# Patient Record
Sex: FEMALE | Race: WHITE | NOT HISPANIC OR LATINO | Employment: FULL TIME | URBAN - METROPOLITAN AREA
[De-identification: names, ages, dates, MRNs, and addresses within clinical notes are randomized per-mention and may not be internally consistent; named-entity substitution may affect disease eponyms.]

---

## 2019-06-21 ENCOUNTER — TELEPHONE (OUTPATIENT)
Dept: SURGERY | Facility: CLINIC | Age: 39
End: 2019-06-21

## 2019-06-21 ENCOUNTER — OFFICE VISIT (OUTPATIENT)
Dept: SURGERY | Facility: CLINIC | Age: 39
End: 2019-06-21
Payer: COMMERCIAL

## 2019-06-21 VITALS — WEIGHT: 174.7 LBS | BODY MASS INDEX: 29.82 KG/M2 | HEIGHT: 64 IN

## 2019-06-21 DIAGNOSIS — N64.81 BREAST PTOSIS: Primary | ICD-10-CM

## 2019-06-21 PROCEDURE — 99203 OFFICE O/P NEW LOW 30 MIN: CPT | Performed by: PLASTIC SURGERY

## 2019-06-21 ASSESSMENT — MIFFLIN-ST. JEOR: SCORE: 1444.49

## 2019-06-21 NOTE — LETTER
6/21/2019         RE: Meghan Penaloza  [address] [place] [address]        Dear Colleague,    Thank you for referring your patient, Meghan Penaloza, to the [facility]. Please see a copy of my visit note below.    REFERRING PHYSICIAN:  Jacinda Pineda DO      PRESENTING COMPLAINT:  Consultation for breast lift and liposuction and abdominal contouring surgery.      HISTORY OF PRESENTING COMPLAINT:  Ms. Penaloza is 38 years old.  She has been thinking about a breast lift small reduction for some time and is also concerned about the excess skin and adiposity in her abdomen, flanks and lateral thighs and medial thigh regions.  She is here to discuss options for treating these areas.  She has lost about 25 pounds over the last year through diet and exercise and wants to lose another 20-25 pounds.  She wears an E cup bra.  She would like to be around a large D cup, but most importantly would like to be more lifted.  She has no family history or personal history of breast cancer.  She has never had a mammogram.  Additionally, she is unhappy with the adiposity in the area described above.      PAST MEDICAL HISTORY:  None.      PAST SURGICAL HISTORY:  Oral surgery.      MEDICATIONS:  None.      ALLERGIES:   Penicillin (rash).  Amoxicillin nausea, vomiting.      SOCIAL HISTORY:  Does not smoke, does not drink.  Lives in [place], is a .      REVIEW OF SYSTEMS:  Denies chest pain, shortness of breath, MI, CVA, DVT and PE.      PHYSICAL EXAMINATION:  Vital signs are stable.  She is afebrile, in no obvious distress.  She is 5 feet, 3-1/2 inches, 174 pounds, BMI of 30 kg/m2.  On examination of her breasts, she has grade 2-3 ptosis.  Left breast is larger and lower than the right breast.  Sternal to pjhvv-yr-jvuelw distance under 35 cm.  On examination of her abdomen, she has a moderate lower abdominal panniculus that does not hang to the pubic symphysis.  She has a significant adiposity in  the subcutaneous planes. Skin pinch thickness about 6 cm. In the upper abdomen, she has a minimal overhang with excess adiposity of about 4 cm. No hernias and the abdominal wall has minimal laxity. Her flanks do also have some adiposity in the lateral thighs and medial thighs have adiposity.      ASSESSMENT AND PLAN:  Based on above findings, a diagnosis of bilateral breast ptosis and hypertrophy, lipodystrophy of the abdomen, flanks and thighs and excess skin of the abdominal wall was made.  Discussed with the patient her findings.  I think she would benefit the most from a small breast reduction/breast lift as well as an abdominoplasty and liposuction of her flanks, thighs and conservative liposuction of the anterior abdominal wall if a simultaneous abdominoplasty is done.  Otherwise, if the liposuction is a staged then aggressive liposuction could be done throughout these areas.  The patient wants to think about it but most likely wants to proceed with a breast lift and abdominoplasty in the first stage and then the abdominal flank and thigh liposuction as the second stage.  I think that is reasonable as long as the patient keeps a stable weight.  I was very clear about getting to her goal weight and then having these procedures done.  I gave her an overview of what to expect from these surgeries.  All risks, benefits, alternatives of the procedures including pain, infection, bleeding, scarring, asymmetry, seromas, hematomas, wound breakdown, wound dehiscence, sensation loss in the nipple, numbness, asymmetries, inability to breast-feed, prominent scar, hypertrophic scar, keloid scar, injury to deeper structures like nerves, vessels and bowel, umbilical necrosis, skin necrosis, contour irregularities, DVT, PE, MI, CVA, pneumonia and death were explained.  She understood everything and wants to think about it and let me know.  Consent obtained.  Photographs obtained.  I gave her a quote.  I look forward to helping  her out in the near future.  If she does proceed with surgery, she will need a mammogram.      Total time spent with patient 30 minutes, more than half was counseling.         Again, thank you for allowing me to participate in the care of your patient.        Sincerely,        KHAI Muhammad MD

## 2019-06-21 NOTE — PROGRESS NOTES
REFERRING PHYSICIAN:  Jacinda Pineda DO      PRESENTING COMPLAINT:  Consultation for breast lift and liposuction and abdominal contouring surgery.      HISTORY OF PRESENTING COMPLAINT:  Ms. Penaloza is 38 years old.  She has been thinking about a breast lift small reduction for some time and is also concerned about the excess skin and adiposity in her abdomen, flanks and lateral thighs and medial thigh regions.  She is here to discuss options for treating these areas.  She has lost about 25 pounds over the last year through diet and exercise and wants to lose another 20-25 pounds.  She wears an E cup bra.  She would like to be around a large D cup, but most importantly would like to be more lifted.  She has no family history or personal history of breast cancer.  She has never had a mammogram.  Additionally, she is unhappy with the adiposity in the area described above.      PAST MEDICAL HISTORY:  None.      PAST SURGICAL HISTORY:  Oral surgery.      MEDICATIONS:  None.      ALLERGIES:   Penicillin (rash).  Amoxicillin nausea, vomiting.      SOCIAL HISTORY:  Does not smoke, does not drink.  Lives in [place], is a .      REVIEW OF SYSTEMS:  Denies chest pain, shortness of breath, MI, CVA, DVT and PE.      PHYSICAL EXAMINATION:  Vital signs are stable.  She is afebrile, in no obvious distress.  She is 5 feet, 3-1/2 inches, 174 pounds, BMI of 30 kg/m2.  On examination of her breasts, she has grade 2-3 ptosis.  Left breast is larger and lower than the right breast.  Sternal to mainh-so-jtsbuc distance under 35 cm.  On examination of her abdomen, she has a moderate lower abdominal panniculus that does not hang to the pubic symphysis.  She has a significant adiposity in the subcutaneous planes. Skin pinch thickness about 6 cm. In the upper abdomen, she has a minimal overhang with excess adiposity of about 4 cm. No hernias and the abdominal wall has minimal laxity. Her flanks do also have some adiposity  in the lateral thighs and medial thighs have adiposity.      ASSESSMENT AND PLAN:  Based on above findings, a diagnosis of bilateral breast ptosis and hypertrophy, lipodystrophy of the abdomen, flanks and thighs and excess skin of the abdominal wall was made.  Discussed with the patient her findings.  I think she would benefit the most from a small breast reduction/breast lift as well as an abdominoplasty and liposuction of her flanks, thighs and conservative liposuction of the anterior abdominal wall if a simultaneous abdominoplasty is done.  Otherwise, if the liposuction is a staged then aggressive liposuction could be done throughout these areas.  The patient wants to think about it but most likely wants to proceed with a breast lift and abdominoplasty in the first stage and then the abdominal flank and thigh liposuction as the second stage.  I think that is reasonable as long as the patient keeps a stable weight.  I was very clear about getting to her goal weight and then having these procedures done.  I gave her an overview of what to expect from these surgeries.  All risks, benefits, alternatives of the procedures including pain, infection, bleeding, scarring, asymmetry, seromas, hematomas, wound breakdown, wound dehiscence, sensation loss in the nipple, numbness, asymmetries, inability to breast-feed, prominent scar, hypertrophic scar, keloid scar, injury to deeper structures like nerves, vessels and bowel, umbilical necrosis, skin necrosis, contour irregularities, DVT, PE, MI, CVA, pneumonia and death were explained.  She understood everything and wants to think about it and let me know.  Consent obtained.  Photographs obtained.  I gave her a quote.  I look forward to helping her out in the near future.  If she does proceed with surgery, she will need a mammogram.      Total time spent with patient 30 minutes, more than half was counseling.

## 2019-06-21 NOTE — NURSING NOTE
"Meghan Penaloza's goals for this visit include: consult breast reduction and liposuction   She requests these members of her care team be copied on today's visit information: no    PCP: Jacinda Beach    Referring Provider:  No referring provider defined for this encounter.    Ht 1.613 m (5' 3.5\")   Wt 79.2 kg (174 lb 11.2 oz)   BMI 30.46 kg/m      Do you need any medication refills at today's visit? No..Nishi Leone RN      "

## 2019-06-21 NOTE — TELEPHONE ENCOUNTER
PREVISIT INFORMATION                                                    Meghan Penaloza scheduled for future visit at [facility] specialty clinics.    Patient is scheduled to see Dr. Muhammad on 6/21/19  Reason for visit: Breast reduction/lift  Referring provider None  Has patient seen previous specialist? No  Medical Records:     REVIEW                                                      New patient packet mailed to patient: No  Medication reconciliation complete: No      No current outpatient medications on file.       Allergies: Patient has no allergy information on record.        PLAN/FOLLOW-UP NEEDED                                                      Previsit review complete.  Patient will see provider at future scheduled appointment.     Patient Reminders Given:  Please, make sure you bring an updated list of your medications.   If you are having a procedure, please, present 15 minutes early.  If you need to cancel or reschedule,please call [phone].    Ananya Cavazos LPN

## 2019-06-21 NOTE — NURSING NOTE
Per Dr. Muhammad bilateral breast pictures to be taken. Pt identifier picture taken first, then arms to sides, arms on hips, 45 and 90 degree angle on both sides....Nishi Leone RN

## 2019-07-02 ENCOUNTER — TELEPHONE (OUTPATIENT)
Dept: SURGERY | Facility: CLINIC | Age: 39
End: 2019-07-02

## 2019-07-02 NOTE — TELEPHONE ENCOUNTER
Per  pt to be called to see if she is interested in proceeding with surgery. Pt called, no answer. VM Id's pt. Left detailed message with reason for call and  for pt to call the [facility] back at [phone]....Nishi Leone RN

## 2019-07-05 DIAGNOSIS — L98.7 EXCESS SKIN OF ABDOMEN: Primary | ICD-10-CM

## 2019-07-05 RX ORDER — CEFAZOLIN SODIUM 1 G/50ML
1 INJECTION, SOLUTION INTRAVENOUS SEE ADMIN INSTRUCTIONS
Status: CANCELLED | OUTPATIENT
Start: 2019-07-05

## 2019-07-05 RX ORDER — CEFAZOLIN SODIUM 2 G/50ML
2 SOLUTION INTRAVENOUS
Status: CANCELLED | OUTPATIENT
Start: 2019-07-05

## 2019-07-05 NOTE — TELEPHONE ENCOUNTER
Pt returned the call and states that she is interested in Abdominoplasty and Mastopexy. RN sent a staff message to  today with the information below....Nishi Leone RN

## 2019-07-05 NOTE — TELEPHONE ENCOUNTER
Pt returned the call and states that she does want to proceed with surgery but would like to loose the weight and stabilize herself then have the surgery done on December 13 th. Pt advised that RN will update  and notify pt on 's response..Nishi Leone RN      RN would like clarification on what surgeries pt is interested in. RN called pt back, no answer. Left detailed message with reason for call and [facility] [phone]...Nishi Leone RN          ASSESSMENT AND PLAN:  Based on above findings, a diagnosis of bilateral breast ptosis and hypertrophy, lipodystrophy of the abdomen, flanks and thighs and excess skin of the abdominal wall was made.  Discussed with the patient her findings.  I think she would benefit the most from a small breast reduction/breast lift as well as an abdominoplasty and liposuction of her flanks, thighs and conservative liposuction of the anterior abdominal wall if a simultaneous abdominoplasty is done.  Otherwise, if the liposuction is a staged then aggressive liposuction could be done throughout these areas.  The patient wants to think about it but most likely wants to proceed with a breast lift and abdominoplasty in the first stage and then the abdominal flank and thigh liposuction as the second stage.  I think that is reasonable as long as the patient keeps a stable weight.  I was very clear about getting to her goal weight and then having these procedures done.  I gave her an overview of what to expect from these surgeries.  All risks, benefits, alternatives of the procedures including pain, infection, bleeding, scarring, asymmetry, seromas, hematomas, wound breakdown, wound dehiscence, sensation loss in the nipple, numbness, asymmetries, inability to breast-feed, prominent scar, hypertrophic scar, keloid scar, injury to deeper structures like nerves, vessels and bowel, umbilical necrosis, skin necrosis, contour irregularities, DVT,  PE, MI, CVA, pneumonia and death were explained.  She understood everything and wants to think about it and let me know.  Consent obtained.  Photographs obtained.  I gave her a quote.  I look forward to helping her out in the near future.  If she does proceed with surgery, she will need a mammogram.      Total time spent with patient 30 minutes, more than half was counseling.

## 2019-07-08 NOTE — TELEPHONE ENCOUNTER
sent a reply staff message back to RN and surgery scheduler  on 7/5/19 that he received the message and that orders are placed...Nishi Leone RN

## 2019-07-10 NOTE — TELEPHONE ENCOUNTER
Date Scheduled: 12-13-19  Facility: [facility]  Surgeon: Dr. Muhammad   Post-op appointment scheduled: 12-20-19   scheduled?: No  Surgery packet/instructions confirmed received?  Yes  Special Considerations:   Ainsley Lange, Surgery Scheduling Coordinator

## 2019-12-04 ENCOUNTER — TELEPHONE (OUTPATIENT)
Dept: SURGERY | Facility: CLINIC | Age: 39
End: 2019-12-04

## 2019-12-04 NOTE — TELEPHONE ENCOUNTER
Pt left a vm today at 10:17am stating that she is scheduled for surgery next week with  and has some questions for Nishi. RN called pt and pt states that she has had an URI and is now on abx and wanted to know if this will affect her having surgery. RN advised pt only if pt has a fever over 101.5   should this affect her surgery. RN advised to call the ASC if her symptoms worsen.  RN reviewed with pt the surgery instructions below. Pt verbalized understanding. Letter sent to pt with pre-surgery instructions as pt states that she never received them...Nishi Leone RN          Surgery Instructions    Always follow your surgeon s instructions. If you don t, your surgery could be cancelled. Please use the following checklist.  Your surgery is on: The surgery scheduler will contact you within 1 week of your consult with the surgeon. If you do not hear from them, please call the clinic or RN Care Coordinator for your provider.    Time: Prearrival times can vary depending on location/type of surgery.  [place] - 2 hour pre-arrival  [facility] - 2 hour pre-arrival  [place] - 1 hour pre-arrival    Note:  These times may change. A nurse will call you before surgery to confirm. If you have not received a call or if you have more questions, please call us on the working day before your surgery:  ? [place]: [phone] or [phone] (9am to 5:30pm)  ? [facility]: [phone] (8am to 6pm)  ? [place]: [phone] (9am to 5pm)  ? [facility] [phone] (7am to 4pm)  Prior to surgery  ? Have a pre-op physical exam with your Primary Doctor within 30 days of surgery  - Ask your doctor to send all of your results to the surgery center/hospital before surgery. Your doctor also may ask you to bring the results with you on the day of surgery.  - Tell your doctor if:  - You are allergic to latex or rubber (latex and rubber gloves are often used in medical care).  - You are taking any medicines  (including aspirin), vitamins, or herbal products. You may need to stop taking some medicines before surgery.  - You have any medical problems (allergies, diabetes, or heart disease, for example).  - You have a pacemaker or an AICD (automatic implanted cardiac defibrillator). If you do, please bring the ID card with you on the day of surgery.  - People who smoke have a higher risk of infection after surgery. Ask your doctor how you can quit smoking.  - If you Primary Doctor is not within the [facility] system, you will need to have your pre-op physical faxed to us to be scanned into your chart.  - [place]: [phone] or [phone]  - [facility] [phone]  - [facility] [phone]  ? Call your insurance company. Ask if you need pre-approval for your surgery. If you do not have insurance, please let us know. If you wish to speak to the , please alert the clinic staff so this can be arranged.  ? Arrange for someone to drive you home after surgery.  will need to be a responsible adult (18 years or older) that will provide transportation to and from surgery and stay in the waiting room during your surgery. You may not drive yourself or take public transportation to and from surgery.  ? Arrange for someone to stay with you for 24 hours after you go home. This person must be a responsible adult (18 years or older).  ? Call your surgeon or their nurse if there is any change in your health (cold, flu, infections, hospitalizations).  ? Do not smoke, drink alcohol, or take over-the-counter medicine for 24 hours before and after surgery.  ? If you take prescribed drugs, you may need to stop them until after the surgery.  Discuss what medications to take or not take prior to surgery with your Primary Doctor at your pre-op physical. Avoid over-the-counter blood-thinning medications such as Aspirin, Ibuprofen, vitamin E, or fish oil 7 days prior to surgery  (unless otherwise directed by your Primary Doctor). Tylenol is a good alternative for mild pain relief prior to surgery.  ? Eating and drinking guidelines prior to surgery:  - Stop all solid food consumption 8 hours prior to surgery  - You may drink clear liquids up to 2 hours prior to surgery (water, fruit juices without pulp, jello, tea/coffee without creamer, sports drinks, clear-fat free broth (bouillon or consomme), popsicles (without milk, bits of fruit, or seeds/nuts)  ? Follow instructions given for showering or bathing before surgery.    - Use 8 ounces of antiseptic surgical soap, like:  - Hibiclens, Scrub Care, or Exidine  - You can find it at your local pharmacy, clinic, or retail store. If you have trouble, ask your pharmacist to help you find the right substitute.  - Please wash with one of the above soaps twice before coming to the hospital for your surgery. This will decrease bacteria (germs) on your skin. It will also help reduce your chance of infection after surgery.  - Items you will need for showering:  - 4 newly washed washcloths  - 2 newly washed towels  - 8 ounces of one of the above soaps  - Following these instructions:  - The evening before surgery: Shower or bathe as you normally would, using your regular soap and a clean washcloth. Give special attention to places where your incision (surgical cut) or catheters will be. This includes your groin area. Rinse well. You may wash your hair with your regular shampoo. Next, wash your body with 4 ounces of the antiseptic soap. Use a clean, damp washcloth and gently clean your body (from the chin down). If your surgery involves your head, use the special soap on your head and scalp. Rinse well and dry off using a newly washed towel.  - The morning of surgery: Repeat the same process as the evening shower.  - Other suggestions:    Do not shave within 12 inches of your incision (surgical cut) area for at least 3 days before surgery. Shaving can make  small cuts in the skin. This puts you at higher risk of infection.    Wear freshly washed pajamas or clothing after your evening shower.    Wear freshly washed clothes the day of surgery.    Wash and change your bed sheets the day before surgery to have clean bed sheets after your shower and when you get home from surgery.    If you have trouble washing all areas, make sure someone helps you.    Don't use any deodorant, lotion or powder after your shower.    Women who are menstruating should wear a fresh sanitary pad to the hospital.  ? Do not wear or add deodorant, cologne, lotion, makeup, nail polish or jewelry to surgery. If you wear fake nails, please remove at least one nail before coming to surgery (an oxygen monitor needs to be placed on your finger during surgery).  ? Bring these items to the surgery center/hospital:  - Insurance card  - Money for parking and co-pays, if needed  - A list of all the medicines you take. Include vitamins, minerals, herbs, and over-the-counter drugs.  Note any drug allergies.  - A copy of your advance health care directive, if you have one. This tells us what treatment you would want--and who would make health care decisions--if you could no longer speak for yourself. You may request this form in advance or download it from [email or web address].  - A case for glasses, contact lenses, hearing aids, or dentures.  - Your inhaler or CPAP machine, if you use these at home.  ? Leave extra cash, jewelry, and other valuables at home.  When you arrive  When you get to the surgery center/hospital, you will:  ? Check in. If you are under age 18, you must be with a parent or legal guardian.  ? Sign consent forms, if you haven t already. These forms state that you know the risks and benefits of surgery. When you sign the forms, you give us permission to do the surgery. Do not sign them unless you understand what will happen during and after your surgery. If you have any questions about  your surgery, ask to speak with your doctor before you sign the forms. If you don t understand the answers, ask again.  ? Receive a copy of the Patient s Bill of Rights. If you do not receive a copy, please ask for one.  ? Change into hospital clothes. Your belongings will be placed in a bag. We will return them to you after surgery.  ? Meet with the anesthesia provider. He or she will tell you what kind of anesthesia (medicine) will be used to keep you comfortable during surgery.  Remember: it s okay to remind doctors and nurses to wash their hands before touching you.  In most cases, your surgeon will use a marker to write his or her initials on the surgery site. This ensures that the exact site is operated on.  For safety reasons, we will ask you the same questions many times. For example, we may ask your name and birth date over and over again.  Friends and family can stay with you until it s time for surgery. While you re in surgery, they will be in the waiting area. Please note that cell phones are not allowed in some patient care areas.  If you have questions about what will happen in the operating room, talk to your care team.  After surgery  We will move you to a recovery room, where we will watch you closely. If you have any pain or discomfort, tell your nurse. He or she will try to make you comfortable.  If you are staying overnight, we will move you to your hospital room after you are awake.  If you are going home, we will move you to another room. Friends and family may be able to join you. The length of time you spend in recovery depend on the type of medicine you received, your medical condition, the type of surgery you had, or your response to the anesthesia given during your procedure.  When you are discharged from the recovery room, the nurses will review instructions with you and your caregiver.  ? Please wash your hands every time you touch the wound or change bandages or dressings.  ? Do not  submerge the wound in water.  You may not use a bathtub or hot tub until the wound is closed. The wait time frame is generally 2-3 weeks, but any open area can be a source of incoming bacteria, so it is better to be on the safe side and avoid water submersion until your wound is fully healed.  ? You may take a shower 24 hours after surgery. Double check with your surgeon if it is OK for water to run over the wound, whether it has been sutured, stapled, glued, or is open. You may gently wash the wound using the antiseptic soap provided for your pre-surgery showering (do not use a washcloth). Any mild soap will work as well.  ? Many surgical wounds will have small white strips of tape on them called steri-strips.  Do not remove these. The edges will curl and fall off within 7-10 days with normal showering.  ? If you are going home with sutures (stitches) or staples, you must return to the clinic to have them taken out, usually within 1-2 weeks. Some stitches are dissolvable and do not require removal. Make sure to clarify with your surgeon or surgery nurse reviewing discharge paperwork what kind of sutures you have.  ? Signs and symptoms of infection include:  - Fever, temperature over 101.5   F  - Redness  - Swelling  - Increased pain  - Green or yellow drainage which may or may not have a foul odor  Dealing with pain  A nurse will check your comfort level often during your stay. He or she will work with you to manage your pain.  Remember:  ? All pain is real. There are many ways to control pain. We can help you decide what works best for you.  ? Ask for pain medicine when you need it. Don t try to  tough it out --this can make you feel worse. Always take your medicine as ordered.  ? Medicine doesn t work the same for everyone. If your medicine isn t working, tell your nurse. There may be other medicines or treatments we can try.  Going home  We will let you know when you re ready to leave the surgery center or  hospital. Before you leave, we will tell you how to care for yourself at home and prevent infections. If you do not understand something, please say so. We will answer any questions you have. We will then help you get ready to leave.  Remember, you must have a responsible adult (18 years or older) to stay with you 24 hours after you leave the hospital.  Take it easy when you get home. You will need some time to recover--you may be more tired than you realize at first. Rest and relax for at least the first 24 hours at home. You ll feel better and heal faster if you take good care of yourself.  Follow the discharge instructions that are given to you when you leave the surgery center or hospital  Please call the clinic if you experience any problems during regular clinic hours (Monday-Friday 8:00am-5:00pm).  If you experience problems during non-clinic hours, please call the [facility] on-call line at [phone] and ask the  to page the on-call Provider for your specialty. The on-call Provider will call you back and can triage your symptoms and further advise. If you are having an emergency, always call 911 or seek immediate evaluation at the Emergency Room.  Locations  [facility]  Same-day surgery center - 2nd floor, check-in #5  [address] [place] [address]  [phone]  [email or web address].[email or web address] ([facility])  [address] [place] [address]  [phone]   [email or web address]    [address] [address]  [phone] (patient registration)  [phone] (main line)  [email or web address]  [facility]  [address]  [place], [place] [address] [place] [address], 3rd floor for check-in  [phone]  (patient registration)  [phone] (main line)  www.[email or web address].org  [facility]  [address] [place] [address]  [phone]  www.[facility].[place].[facility]  [address] [facility] [place] [address]  [phone]  [email or web address]  [facility]  [address] [place] [address]  [phone]  www.[email or web address].[facility]  [address] [address]  [phone]  [email or web address]  [facility]  [address] [place] [address]  [phone]  [email or web address]  [facility]  [address] [place] [address]  [phone]  [email or web address]

## 2019-12-04 NOTE — LETTER
Ms.Linda KHAI Penaloza  [address] [facility] [facility] [address]        December 4, 2019    Dear MsShalomTremaine,      I have enclosed below the pre-surgery instructions for you to review. If you have any further questions please don't hesitate to call me at [phone].      Thank you and have a great day      MAR Almodovar     Surgery Instructions    Always follow your surgeon s instructions. If you don t, your surgery could be cancelled. Please use the following checklist.  Your surgery is on: The surgery scheduler will contact you within 1 week of your consult with the surgeon. If you do not hear from them, please call the clinic or RN Care Coordinator for your provider.    Time: Prearrival times can vary depending on location/type of surgery.  [place] - 2 hour pre-arrival  [facility] - 2 hour pre-arrival  [place] - 1 hour pre-arrival    Note:  These times may change. A nurse will call you before surgery to confirm. If you have not received a call or if you have more questions, please call us on the working day before your surgery:  ? [place]: [phone] or [phone] (9am to 5:30pm)  ? [facility]: [phone] (8am to 6pm)  ? [place]: [phone] (9am to 5pm)  ? [facility] [phone] (7am to 4pm)  Prior to surgery  ? Have a pre-op physical exam with your Primary Doctor within 30 days of surgery  - Ask your doctor to send all of your results to the surgery center/hospital before surgery. Your doctor also may ask you to bring the results with you on the day of surgery.  - Tell your doctor if:  - You are allergic to latex or rubber (latex and rubber gloves are often used in medical care).  - You are taking any medicines (including aspirin), vitamins, or herbal products. You may need to stop taking some medicines before surgery.  - You have any medical problems (allergies, diabetes, or heart disease, for example).  - You have a pacemaker or an AICD (automatic implanted cardiac defibrillator). If you  do, please bring the ID card with you on the day of surgery.  - People who smoke have a higher risk of infection after surgery. Ask your doctor how you can quit smoking.  - If you Primary Doctor is not within the [facility] system, you will need to have your pre-op physical faxed to us to be scanned into your chart.  - [place]: [phone] or [phone]  - [facility]): [phone]  - [facility] [phone]  ? Call your insurance company. Ask if you need pre-approval for your surgery. If you do not have insurance, please let us know. If you wish to speak to the , please alert the clinic staff so this can be arranged.  ? Arrange for someone to drive you home after surgery.  will need to be a responsible adult (18 years or older) that will provide transportation to and from surgery and stay in the waiting room during your surgery. You may not drive yourself or take public transportation to and from surgery.  ? Arrange for someone to stay with you for 24 hours after you go home. This person must be a responsible adult (18 years or older).  ? Call your surgeon or their nurse if there is any change in your health (cold, flu, infections, hospitalizations).  ? Do not smoke, drink alcohol, or take over-the-counter medicine for 24 hours before and after surgery.  ? If you take prescribed drugs, you may need to stop them until after the surgery.  Discuss what medications to take or not take prior to surgery with your Primary Doctor at your pre-op physical. Avoid over-the-counter blood-thinning medications such as Aspirin, Ibuprofen, vitamin E, or fish oil 7 days prior to surgery (unless otherwise directed by your Primary Doctor). Tylenol is a good alternative for mild pain relief prior to surgery.  ? Eating and drinking guidelines prior to surgery:  - Stop all solid food consumption 8 hours prior to surgery  - You may drink clear liquids up to 2 hours prior to  surgery (water, fruit juices without pulp, jello, tea/coffee without creamer, sports drinks, clear-fat free broth (bouillon or consomme), popsicles (without milk, bits of fruit, or seeds/nuts)  ? Follow instructions given for showering or bathing before surgery.    - Use 8 ounces of antiseptic surgical soap, like:  - Hibiclens, Scrub Care, or Exidine  - You can find it at your local pharmacy, clinic, or retail store. If you have trouble, ask your pharmacist to help you find the right substitute.  - Please wash with one of the above soaps twice before coming to the hospital for your surgery. This will decrease bacteria (germs) on your skin. It will also help reduce your chance of infection after surgery.  - Items you will need for showering:  - 4 newly washed washcloths  - 2 newly washed towels  - 8 ounces of one of the above soaps  - Following these instructions:  - The evening before surgery: Shower or bathe as you normally would, using your regular soap and a clean washcloth. Give special attention to places where your incision (surgical cut) or catheters will be. This includes your groin area. Rinse well. You may wash your hair with your regular shampoo. Next, wash your body with 4 ounces of the antiseptic soap. Use a clean, damp washcloth and gently clean your body (from the chin down). If your surgery involves your head, use the special soap on your head and scalp. Rinse well and dry off using a newly washed towel.  - The morning of surgery: Repeat the same process as the evening shower.  - Other suggestions:    Do not shave within 12 inches of your incision (surgical cut) area for at least 3 days before surgery. Shaving can make small cuts in the skin. This puts you at higher risk of infection.    Wear freshly washed pajamas or clothing after your evening shower.    Wear freshly washed clothes the day of surgery.    Wash and change your bed sheets the day before surgery to have clean bed sheets after your  shower and when you get home from surgery.    If you have trouble washing all areas, make sure someone helps you.    Don't use any deodorant, lotion or powder after your shower.    Women who are menstruating should wear a fresh sanitary pad to the hospital.  ? Do not wear or add deodorant, cologne, lotion, makeup, nail polish or jewelry to surgery. If you wear fake nails, please remove at least one nail before coming to surgery (an oxygen monitor needs to be placed on your finger during surgery).  ? Bring these items to the surgery center/hospital:  - Insurance card  - Money for parking and co-pays, if needed  - A list of all the medicines you take. Include vitamins, minerals, herbs, and over-the-counter drugs.  Note any drug allergies.  - A copy of your advance health care directive, if you have one. This tells us what treatment you would want--and who would make health care decisions--if you could no longer speak for yourself. You may request this form in advance or download it from [email or web address].  - A case for glasses, contact lenses, hearing aids, or dentures.  - Your inhaler or CPAP machine, if you use these at home.  ? Leave extra cash, jewelry, and other valuables at home.  When you arrive  When you get to the surgery center/hospital, you will:  ? Check in. If you are under age 18, you must be with a parent or legal guardian.  ? Sign consent forms, if you haven t already. These forms state that you know the risks and benefits of surgery. When you sign the forms, you give us permission to do the surgery. Do not sign them unless you understand what will happen during and after your surgery. If you have any questions about your surgery, ask to speak with your doctor before you sign the forms. If you don t understand the answers, ask again.  ? Receive a copy of the Patient s Bill of Rights. If you do not receive a copy, please ask for one.  ? Change into hospital clothes. Your belongings will be placed  in a bag. We will return them to you after surgery.  ? Meet with the anesthesia provider. He or she will tell you what kind of anesthesia (medicine) will be used to keep you comfortable during surgery.  Remember: it s okay to remind doctors and nurses to wash their hands before touching you.  In most cases, your surgeon will use a marker to write his or her initials on the surgery site. This ensures that the exact site is operated on.  For safety reasons, we will ask you the same questions many times. For example, we may ask your name and birth date over and over again.  Friends and family can stay with you until it s time for surgery. While you re in surgery, they will be in the waiting area. Please note that cell phones are not allowed in some patient care areas.  If you have questions about what will happen in the operating room, talk to your care team.  After surgery  We will move you to a recovery room, where we will watch you closely. If you have any pain or discomfort, tell your nurse. He or she will try to make you comfortable.  If you are staying overnight, we will move you to your hospital room after you are awake.  If you are going home, we will move you to another room. Friends and family may be able to join you. The length of time you spend in recovery depend on the type of medicine you received, your medical condition, the type of surgery you had, or your response to the anesthesia given during your procedure.  When you are discharged from the recovery room, the nurses will review instructions with you and your caregiver.  ? Please wash your hands every time you touch the wound or change bandages or dressings.  ? Do not submerge the wound in water.  You may not use a bathtub or hot tub until the wound is closed. The wait time frame is generally 2-3 weeks, but any open area can be a source of incoming bacteria, so it is better to be on the safe side and avoid water submersion until your wound is fully  healed.  ? You may take a shower 24 hours after surgery. Double check with your surgeon if it is OK for water to run over the wound, whether it has been sutured, stapled, glued, or is open. You may gently wash the wound using the antiseptic soap provided for your pre-surgery showering (do not use a washcloth). Any mild soap will work as well.  ? Many surgical wounds will have small white strips of tape on them called steri-strips.  Do not remove these. The edges will curl and fall off within 7-10 days with normal showering.  ? If you are going home with sutures (stitches) or staples, you must return to the clinic to have them taken out, usually within 1-2 weeks. Some stitches are dissolvable and do not require removal. Make sure to clarify with your surgeon or surgery nurse reviewing discharge paperwork what kind of sutures you have.  ? Signs and symptoms of infection include:  - Fever, temperature over 101.5   F  - Redness  - Swelling  - Increased pain  - Green or yellow drainage which may or may not have a foul odor  Dealing with pain  A nurse will check your comfort level often during your stay. He or she will work with you to manage your pain.  Remember:  ? All pain is real. There are many ways to control pain. We can help you decide what works best for you.  ? Ask for pain medicine when you need it. Don t try to  tough it out --this can make you feel worse. Always take your medicine as ordered.  ? Medicine doesn t work the same for everyone. If your medicine isn t working, tell your nurse. There may be other medicines or treatments we can try.  Going home  We will let you know when you re ready to leave the surgery center or hospital. Before you leave, we will tell you how to care for yourself at home and prevent infections. If you do not understand something, please say so. We will answer any questions you have. We will then help you get ready to leave.  Remember, you must have a responsible adult (18 years or  older) to stay with you 24 hours after you leave the hospital.  Take it easy when you get home. You will need some time to recover--you may be more tired than you realize at first. Rest and relax for at least the first 24 hours at home. You ll feel better and heal faster if you take good care of yourself.  Follow the discharge instructions that are given to you when you leave the surgery center or hospital  Please call the clinic if you experience any problems during regular clinic hours (Monday-Friday 8:00am-5:00pm).  If you experience problems during non-clinic hours, please call the [facility] on-call line at [phone] and ask the  to page the on-call Provider for your specialty. The on-call Provider will call you back and can triage your symptoms and further advise. If you are having an emergency, always call 911 or seek immediate evaluation at the Emergency Room.  Locations  [facility]  Same-day surgery center - 2nd floor, check-in #5  [address] [place] [address]  [phone]  [email or web address].[facility] ([facility])  [address] [place] [address]  [phone]   [email or web address]    [address] [address]  [phone] (patient registration)  [phone] (main line)  [email or web address]  [facility]  [address] [place] [address]  [facility], 3rd floor for check-in  [phone] (patient registration)  [phone] (main line)  [email or web address]  [facility]  [address] [place] [place] [address]  [phone]  [email or web address].[email or web address]  [address] [place]  [address]  [phone]  [email or web address]  [facility]  [address] [place] [address]  [phone]  [email or web address]  [facility]  [address] [place] [address]  [phone]  [email or web address]  [facility]  [address] [place] [address]  [phone]  [email or web address]  [facility]  [address] [place] [address]  [phone]  [email or web address]

## 2019-12-06 ENCOUNTER — TRANSFERRED RECORDS (OUTPATIENT)
Dept: HEALTH INFORMATION MANAGEMENT | Facility: CLINIC | Age: 39
End: 2019-12-06

## 2019-12-12 ENCOUNTER — ANESTHESIA EVENT (OUTPATIENT)
Dept: SURGERY | Facility: AMBULATORY SURGERY CENTER | Age: 39
End: 2019-12-12

## 2019-12-12 NOTE — ANESTHESIA PREPROCEDURE EVALUATION
Anesthesia Pre-Procedure Evaluation    Patient: Meghan Penaloza   MRN:     [id number] Gender:   female   Age:    39 year old :      1980        Preoperative Diagnosis: EXCESS SKIN AND PTOSIS   Procedure(s):  ABDOMINOPLASTY, COSMETIC  BREAST LIFT BILATERAL     No past medical history on file.   No past surgical history on file.       Anesthesia Evaluation     . Pt has not had prior anesthetic            ROS/MED HX    ENT/Pulmonary:  - neg pulmonary ROS     Neurologic:  - neg neurologic ROS     Cardiovascular:  - neg cardiovascular ROS       METS/Exercise Tolerance:  >4 METS   Hematologic:  - neg hematologic  ROS       Musculoskeletal:  - neg musculoskeletal ROS       GI/Hepatic:  - neg GI/hepatic ROS       Renal/Genitourinary:  - ROS Renal section negative       Endo:  - neg endo ROS       Psychiatric:  - neg psychiatric ROS       Infectious Disease:  - neg infectious disease ROS       Malignancy:      - no malignancy   Other:    - neg other ROS                     PHYSICAL EXAM:   Mental Status/Neuro: A/A/O   Airway: Facies: Feasible  Mallampati: I  Mouth/Opening: Full  TM distance: > 6 cm  Neck ROM: Full   Respiratory: Auscultation: CTAB     Resp. Rate: Normal     Resp. Effort: Normal      CV: Rhythm: Regular  Rate: Age appropriate  Heart: Normal Sounds  Edema: None   Comments:      Dental: Normal Dentition                LABS:  CBC: No results found for: WBC, HGB, HCT, PLT  BMP: No results found for: NA, POTASSIUM, CHLORIDE, CO2, BUN, CR, GLC  COAGS: No results found for: PTT, INR, FIBR  POC: No results found for: BGM, HCG, HCGS  OTHER: No results found for: PH, LACT, A1C, YOLANDA, PHOS, MAG, ALBUMIN, PROTTOTAL, ALT, AST, GGT, ALKPHOS, BILITOTAL, BILIDIRECT, LIPASE, AMYLASE, LARISSA, TSH, T4, T3, CRP, SED     Preop Vitals    BP Readings from Last 3 Encounters:   No data found for BP    Pulse Readings from Last 3 Encounters:   No data found for Pulse      Resp Readings from Last 3 Encounters:   No data found for  "Resp    SpO2 Readings from Last 3 Encounters:   No data found for SpO2      Temp Readings from Last 1 Encounters:   No data found for Temp    Ht Readings from Last 1 Encounters:   06/21/19 1.613 m (5' 3.5\")      Wt Readings from Last 1 Encounters:   06/21/19 79.2 kg (174 lb 11.2 oz)    Estimated body mass index is 30.46 kg/m  as calculated from the following:    Height as of 6/21/19: 1.613 m (5' 3.5\").    Weight as of 6/21/19: 79.2 kg (174 lb 11.2 oz).     LDA:        Assessment:   ASA SCORE: 1    H&P: History and physical reviewed and following examination; no interval change.   Smoking Status:  Non-Smoker/Unknown   NPO Status: NPO Appropriate     Plan:   Anes. Type:  General   Pre-Medication: None   Induction:  IV (Standard)   Airway: ETT; Oral   Access/Monitoring: PIV   Maintenance: Balanced     Postop Plan:   Postop Pain: Opioids  Postop Sedation/Airway: Not planned  Disposition: Outpatient     PONV Management:   Adult Risk Factors: Female, Non-Smoker, Postop Opioids   Prevention: Ondansetron, Dexamethasone, Propofol     CONSENT:   Plan and risks discussed with: Patient   Blood Products: Consent Deferred (Minimal Blood Loss)       Comments for Plan/Consent:  GETA, Standard ASA monitoring  All available and pertinent medical records and test results reviewed.  Risks, including but not limited to airway injury, bronchospasm,  hypoxemia, PONV d/w patient                 Hamilton Khan MD  "

## 2019-12-13 ENCOUNTER — OFFICE VISIT (OUTPATIENT)
Dept: SURGERY | Facility: CLINIC | Age: 39
End: 2019-12-13
Payer: COMMERCIAL

## 2019-12-13 ENCOUNTER — ANESTHESIA (OUTPATIENT)
Dept: SURGERY | Facility: AMBULATORY SURGERY CENTER | Age: 39
End: 2019-12-13
Payer: COMMERCIAL

## 2019-12-13 ENCOUNTER — HOSPITAL ENCOUNTER (OUTPATIENT)
Facility: AMBULATORY SURGERY CENTER | Age: 39
Discharge: HOME | End: 2019-12-13
Attending: PLASTIC SURGERY | Admitting: PLASTIC SURGERY

## 2019-12-13 VITALS
HEART RATE: 74 BPM | TEMPERATURE: 99.3 F | SYSTOLIC BLOOD PRESSURE: 118 MMHG | DIASTOLIC BLOOD PRESSURE: 78 MMHG | RESPIRATION RATE: 9 BRPM | OXYGEN SATURATION: 100 %

## 2019-12-13 DIAGNOSIS — Z98.890 S/P ABDOMINOPLASTY: Primary | ICD-10-CM

## 2019-12-13 LAB — HCG UR QL: NEGATIVE

## 2019-12-13 PROCEDURE — G8907 PT DOC NO EVENTS ON DISCHARG: HCPCS

## 2019-12-13 PROCEDURE — G8916 PT W IV AB GIVEN ON TIME: HCPCS

## 2019-12-13 PROCEDURE — 88305 TISSUE EXAM BY PATHOLOGIST: CPT | Performed by: PLASTIC SURGERY

## 2019-12-13 PROCEDURE — 36000140 ZZH SURGERY LEVEL COSMETIC 120 MIN

## 2019-12-13 PROCEDURE — 99024 POSTOP FOLLOW-UP VISIT: CPT | Performed by: PLASTIC SURGERY

## 2019-12-13 PROCEDURE — 36000144 ZZH SURGERY LEVEL COSMETIC 240 MIN

## 2019-12-13 PROCEDURE — 36000143

## 2019-12-13 PROCEDURE — 81025 URINE PREGNANCY TEST: CPT | Performed by: ANESTHESIOLOGY

## 2019-12-13 RX ORDER — PROPOFOL 10 MG/ML
INJECTION, EMULSION INTRAVENOUS PRN
Status: DISCONTINUED | OUTPATIENT
Start: 2019-12-13 | End: 2019-12-13

## 2019-12-13 RX ORDER — SODIUM CHLORIDE, SODIUM LACTATE, POTASSIUM CHLORIDE, CALCIUM CHLORIDE 600; 310; 30; 20 MG/100ML; MG/100ML; MG/100ML; MG/100ML
INJECTION, SOLUTION INTRAVENOUS CONTINUOUS
Status: DISCONTINUED | OUTPATIENT
Start: 2019-12-13 | End: 2019-12-14 | Stop reason: HOSPADM

## 2019-12-13 RX ORDER — ACETAMINOPHEN 325 MG/1
975 TABLET ORAL ONCE
Status: COMPLETED | OUTPATIENT
Start: 2019-12-13 | End: 2019-12-13

## 2019-12-13 RX ORDER — ONDANSETRON 2 MG/ML
INJECTION INTRAMUSCULAR; INTRAVENOUS PRN
Status: DISCONTINUED | OUTPATIENT
Start: 2019-12-13 | End: 2019-12-13

## 2019-12-13 RX ORDER — AMOXICILLIN 250 MG
1-2 CAPSULE ORAL 2 TIMES DAILY
Qty: 30 TABLET | Refills: 0 | Status: SHIPPED | OUTPATIENT
Start: 2019-12-13 | End: 2020-01-24

## 2019-12-13 RX ORDER — FENTANYL CITRATE 50 UG/ML
INJECTION, SOLUTION INTRAMUSCULAR; INTRAVENOUS PRN
Status: DISCONTINUED | OUTPATIENT
Start: 2019-12-13 | End: 2019-12-13

## 2019-12-13 RX ORDER — PROPOFOL 10 MG/ML
INJECTION, EMULSION INTRAVENOUS CONTINUOUS PRN
Status: DISCONTINUED | OUTPATIENT
Start: 2019-12-13 | End: 2019-12-13

## 2019-12-13 RX ORDER — CEFAZOLIN SODIUM 2 G/100ML
2 INJECTION, SOLUTION INTRAVENOUS
Status: COMPLETED | OUTPATIENT
Start: 2019-12-13 | End: 2019-12-13

## 2019-12-13 RX ORDER — HEPARIN SODIUM 1000 [USP'U]/ML
5000 INJECTION, SOLUTION INTRAVENOUS; SUBCUTANEOUS ONCE
Status: COMPLETED | OUTPATIENT
Start: 2019-12-13 | End: 2019-12-13

## 2019-12-13 RX ORDER — OXYCODONE HYDROCHLORIDE 5 MG/1
5 TABLET ORAL EVERY 4 HOURS PRN
Status: DISCONTINUED | OUTPATIENT
Start: 2019-12-13 | End: 2019-12-14 | Stop reason: HOSPADM

## 2019-12-13 RX ORDER — OXYCODONE HYDROCHLORIDE 5 MG/1
5-10 TABLET ORAL EVERY 6 HOURS PRN
Qty: 20 TABLET | Refills: 0 | Status: SHIPPED | OUTPATIENT
Start: 2019-12-13 | End: 2020-01-24

## 2019-12-13 RX ORDER — LIDOCAINE 40 MG/G
CREAM TOPICAL
Status: DISCONTINUED | OUTPATIENT
Start: 2019-12-13 | End: 2019-12-14 | Stop reason: HOSPADM

## 2019-12-13 RX ORDER — DEXAMETHASONE SODIUM PHOSPHATE 10 MG/ML
INJECTION, SOLUTION INTRAMUSCULAR; INTRAVENOUS PRN
Status: DISCONTINUED | OUTPATIENT
Start: 2019-12-13 | End: 2019-12-13

## 2019-12-13 RX ORDER — CEFAZOLIN SODIUM 1 G/3ML
1 INJECTION, POWDER, FOR SOLUTION INTRAMUSCULAR; INTRAVENOUS SEE ADMIN INSTRUCTIONS
Status: DISCONTINUED | OUTPATIENT
Start: 2019-12-13 | End: 2019-12-14 | Stop reason: HOSPADM

## 2019-12-13 RX ORDER — ONDANSETRON 4 MG/1
4-8 TABLET, ORALLY DISINTEGRATING ORAL EVERY 8 HOURS PRN
Qty: 4 TABLET | Refills: 0 | Status: SHIPPED | OUTPATIENT
Start: 2019-12-13 | End: 2020-01-24

## 2019-12-13 RX ORDER — ONDANSETRON 2 MG/ML
4 INJECTION INTRAMUSCULAR; INTRAVENOUS EVERY 30 MIN PRN
Status: DISCONTINUED | OUTPATIENT
Start: 2019-12-13 | End: 2019-12-14 | Stop reason: HOSPADM

## 2019-12-13 RX ORDER — FENTANYL CITRATE 50 UG/ML
25-50 INJECTION, SOLUTION INTRAMUSCULAR; INTRAVENOUS
Status: DISCONTINUED | OUTPATIENT
Start: 2019-12-13 | End: 2019-12-14 | Stop reason: HOSPADM

## 2019-12-13 RX ORDER — HYDROMORPHONE HYDROCHLORIDE 1 MG/ML
.3-.5 INJECTION, SOLUTION INTRAMUSCULAR; INTRAVENOUS; SUBCUTANEOUS EVERY 10 MIN PRN
Status: DISCONTINUED | OUTPATIENT
Start: 2019-12-13 | End: 2019-12-14 | Stop reason: HOSPADM

## 2019-12-13 RX ORDER — PHENYLEPHRINE HYDROCHLORIDE 10 MG/ML
INJECTION INTRAVENOUS PRN
Status: DISCONTINUED | OUTPATIENT
Start: 2019-12-13 | End: 2019-12-13

## 2019-12-13 RX ORDER — MEPERIDINE HYDROCHLORIDE 25 MG/ML
12.5 INJECTION INTRAMUSCULAR; INTRAVENOUS; SUBCUTANEOUS
Status: DISCONTINUED | OUTPATIENT
Start: 2019-12-13 | End: 2019-12-14 | Stop reason: HOSPADM

## 2019-12-13 RX ORDER — BUPIVACAINE HYDROCHLORIDE 2.5 MG/ML
INJECTION, SOLUTION INFILTRATION; PERINEURAL PRN
Status: DISCONTINUED | OUTPATIENT
Start: 2019-12-13 | End: 2019-12-13 | Stop reason: HOSPADM

## 2019-12-13 RX ORDER — NALOXONE HYDROCHLORIDE 0.4 MG/ML
.1-.4 INJECTION, SOLUTION INTRAMUSCULAR; INTRAVENOUS; SUBCUTANEOUS
Status: DISCONTINUED | OUTPATIENT
Start: 2019-12-13 | End: 2019-12-14 | Stop reason: HOSPADM

## 2019-12-13 RX ORDER — ONDANSETRON 4 MG/1
4 TABLET, ORALLY DISINTEGRATING ORAL EVERY 30 MIN PRN
Status: DISCONTINUED | OUTPATIENT
Start: 2019-12-13 | End: 2019-12-14 | Stop reason: HOSPADM

## 2019-12-13 RX ORDER — LIDOCAINE HYDROCHLORIDE 20 MG/ML
INJECTION, SOLUTION INFILTRATION; PERINEURAL PRN
Status: DISCONTINUED | OUTPATIENT
Start: 2019-12-13 | End: 2019-12-13

## 2019-12-13 RX ORDER — GABAPENTIN 300 MG/1
300 CAPSULE ORAL ONCE
Status: COMPLETED | OUTPATIENT
Start: 2019-12-13 | End: 2019-12-13

## 2019-12-13 RX ADMIN — PROPOFOL 200 MCG/KG/MIN: 10 INJECTION, EMULSION INTRAVENOUS at 07:10

## 2019-12-13 RX ADMIN — FENTANYL CITRATE 25 MCG: 50 INJECTION, SOLUTION INTRAMUSCULAR; INTRAVENOUS at 11:22

## 2019-12-13 RX ADMIN — Medication 40 MG: at 07:10

## 2019-12-13 RX ADMIN — Medication 20 MG: at 07:37

## 2019-12-13 RX ADMIN — ONDANSETRON 4 MG: 2 INJECTION INTRAMUSCULAR; INTRAVENOUS at 07:15

## 2019-12-13 RX ADMIN — Medication 0.5 MG: at 08:07

## 2019-12-13 RX ADMIN — Medication 0.5 MG: at 10:55

## 2019-12-13 RX ADMIN — OXYCODONE HYDROCHLORIDE 5 MG: 5 TABLET ORAL at 11:23

## 2019-12-13 RX ADMIN — CEFAZOLIN SODIUM 2 G: 2 INJECTION, SOLUTION INTRAVENOUS at 07:20

## 2019-12-13 RX ADMIN — FENTANYL CITRATE 50 MCG: 50 INJECTION, SOLUTION INTRAMUSCULAR; INTRAVENOUS at 07:44

## 2019-12-13 RX ADMIN — PROPOFOL 200 MG: 10 INJECTION, EMULSION INTRAVENOUS at 07:10

## 2019-12-13 RX ADMIN — HEPARIN SODIUM 5000 UNITS: 1000 INJECTION, SOLUTION INTRAVENOUS; SUBCUTANEOUS at 06:59

## 2019-12-13 RX ADMIN — GABAPENTIN 300 MG: 300 CAPSULE ORAL at 06:32

## 2019-12-13 RX ADMIN — DEXAMETHASONE SODIUM PHOSPHATE 10 MG: 10 INJECTION, SOLUTION INTRAMUSCULAR; INTRAVENOUS at 07:15

## 2019-12-13 RX ADMIN — PHENYLEPHRINE HYDROCHLORIDE 100 MCG: 10 INJECTION INTRAVENOUS at 08:31

## 2019-12-13 RX ADMIN — SODIUM CHLORIDE, SODIUM LACTATE, POTASSIUM CHLORIDE, CALCIUM CHLORIDE: 600; 310; 30; 20 INJECTION, SOLUTION INTRAVENOUS at 07:21

## 2019-12-13 RX ADMIN — CEFAZOLIN SODIUM 1 G: 2 INJECTION, SOLUTION INTRAVENOUS at 09:20

## 2019-12-13 RX ADMIN — ACETAMINOPHEN 975 MG: 325 TABLET ORAL at 06:32

## 2019-12-13 RX ADMIN — SODIUM CHLORIDE, SODIUM LACTATE, POTASSIUM CHLORIDE, CALCIUM CHLORIDE: 600; 310; 30; 20 INJECTION, SOLUTION INTRAVENOUS at 10:31

## 2019-12-13 RX ADMIN — Medication 20 MG: at 08:24

## 2019-12-13 RX ADMIN — LIDOCAINE HYDROCHLORIDE 60 MG: 20 INJECTION, SOLUTION INFILTRATION; PERINEURAL at 07:10

## 2019-12-13 RX ADMIN — FENTANYL CITRATE 50 MCG: 50 INJECTION, SOLUTION INTRAMUSCULAR; INTRAVENOUS at 07:10

## 2019-12-13 RX ADMIN — PHENYLEPHRINE HYDROCHLORIDE 50 MCG: 10 INJECTION INTRAVENOUS at 08:20

## 2019-12-13 RX ADMIN — SODIUM CHLORIDE, SODIUM LACTATE, POTASSIUM CHLORIDE, CALCIUM CHLORIDE: 600; 310; 30; 20 INJECTION, SOLUTION INTRAVENOUS at 07:00

## 2019-12-13 NOTE — LETTER
2019         RE: Meghan Turner  [address] [facility] [address]        Dear Colleague,    Thank you for referring your patient, Meghan Turner, to the [facility]. Please see a copy of my visit note below.    Service Date: 2019      PRESENTING COMPLAINT:  Postoperative visit, status post cosmetic abdominoplasty, panniculectomy and bilateral mastopexy done 2019.      HISTORY OF PRESENTING COMPLAINT:  Ms. Turner is 39 years old.  She had her surgery done this morning.  She had some concerns about some blood soaked wrap and therefore we had her come in just to make sure she was doing okay.      PHYSICAL EXAMINATION:  Vital signs are stable.  She is afebrile, in no obvious distress.  Incisions are intact.  No evidence for hematoma.  Slight drainage from the periumbilical incision.      ASSESSMENT AND PLAN:  Based on above findings, a diagnosis of cosmetic bilateral mastopexy and abdominoplasty was made.  I reassured her everything is healing in well.  Replaced the dressings.  I will see her back in a week's time.         KHAI MUHAMMAD MD             D: 2019   T: 2019   MT: [email or web address]      Name:     MEGHAN TURNER   MRN:      [id number]        Account:      [id number]   :      1980           Service Date: 2019      Document: [id number]       Again, thank you for allowing me to participate in the care of your patient.        Sincerely,        KHAI Muhammad MD

## 2019-12-13 NOTE — BRIEF OP NOTE
[facility] Brief Operative Note    Pre-operative diagnosis: EXCESS SKIN AND PTOSIS   Post-operative diagnosis As above   Procedure: Procedure(s):  ABDOMINOPLASTY, COSMETIC  BILATERAL MASTOPEXY   Surgeon(s): Surgeon(s) and Role:     * KHAI Muhammad MD - Primary   Estimated blood loss: 150 mL    Specimens: ID Type Source Tests Collected by Time Destination   A : right breast tissue  152 grams Tissue Breast, Right SURGICAL PATHOLOGY EXAM KHAI Muhammad MD 12/13/2019  9:33 AM    B : Left breast tissue  233 grams Tissue Breast, Left SURGICAL PATHOLOGY EXAM KHAI Muhammad MD 12/13/2019  9:35 AM       Findings: No drains

## 2019-12-13 NOTE — DISCHARGE INSTRUCTIONS
BREAST REDUCTION POST-OPERATIVE INSTRUCTIONS    Instructions       Have someone drive you home after surgery and help you at home for 1-2      days.      Get plenty of rest.      Follow balanced diet.      Decreased activity may promote constipation, so you may want to add      more raw fruit to your diet, and be sure to increase fluid intake. Your doctor       may also order a stool softener.      Take pain medication as prescribed. Do not take aspirin or any products      containing aspirin.      Do not drink alcohol when taking pain medications.      Even when not taking pain medications, no alcohol for 3 weeks as it      causes fluid retention.      If you are taking vitamins with iron, resume these as tolerated.      Do not smoke, as smoking delays healing and increases the risk of      complications.    Activities      Do not drive fpr 10 days following your procedure and until you are no longer taking        any pain medications (narcotics).      Do not drive until you have full range of motion with your arms and can stop the car       or swerve in an emergency.      Start walking the evening of surgery, this helps to reduce swelling and       lowers the chance of blood clots.      Refrain from vigorous activities for 2-6 weeks.  Increase activity gradually as tolerated.      After 2 weeks, you may perform lower body exercise, but must wait the full 6 weeks       prior to performing upper body exercise      Avoid lifting anything over 5 pounds for 2 weeks.      Resume social and employment activities in about 2 weeks (if not too strenuous).    Incision Care      You may shower in 48 hours.  If drainage tubes have been used, you may shower       48 hours after removal of the drains. The ACE wrap (if used) may be rewrapped as needed (if too tight or loose). Use it for support and may subtitute with a sports bra if preferred.       Avoid exposing scars to sun for at least 12 months.      Always use a strong  "sunblock, if sun exposure is unavoidable (SPF 50 or      greater).      Keep the tape on until it starts to curl up on the ends, and then gently remove them.        You may use moisturizing cream at 10 days to help the tape come off. By two weeks,      you may pull off the tape if still in place.      Wear your surgical bra/wrap 24/7 as directed for 4 weeks.      Avoid bras with stays and underwires for 4-6 weeks.      You may pad the incisions with gauze for comfort (panty liners also work well as they        are absorbent and inexpensive).      Inspect daily for signs of infection.      No tub soaking, bathing, or swimming until wounds are healed.      If your breast skin is dry after surgery, you can apply a moisturizer several times a       day.     What to Expect      Despite the three layers of sutures closing your incisions, there will be some oozing       of tissue fluid from them for 2 days or so.  This will soak up on the gauze and the bra       to look like more than it really is.  Report any significant drainage to the clinic.      Most of the higher discomfort will subside after the first few days.      You may experience temporary soreness, bruising, swelling and tightnessin the       breasts as well as discomfort in the incision area.      You may have have normal sensation in the nipples.  This may be more or less than       usual, and usually returns over a couple of months.      Your first menstruation following surgery may cause your breasts to swell and hurt.      You may have random shooting pains, tingling, or other strange sensations in the            skin for a few months.  These will subside.    Appearance      Most of the discoloration and swelling will subside in 2-4 weeks.      Your breasts will feel firm to the touch initially, but will soften with time.      A more natural shape will occur as the breasts \"settle\" in a slightly lower position over     the first few months.      Scars may " be red and thick for 6-12 months (longer in lighter-skinned patients).  IN          time, these usually soften and fade.    Follow-Up Care      Typically, you will have a post op check at 1-2 weeks, and again with your surgeon in      another month.      If drainage tubes have been used, will be removed when the drainage is less than 30      ml per day for 1-2 days.  This usually happens in 1-3 weeks.    When to Call      If you have increased swelling or bruising, particular one side greater than the other.      If swelling and redness persist after a few days.      If you have increased redness along the incision.      If you have severe or increased pain not relieved by medication.      If you have any side effects to medications; such as, rash, nausea,      headache, vomiting, or constipation.      If you have an oral temperature over 100.4 degrees.      If you have any yellowish or greenish drainage from the incisions or      notice a foul odor.      If you have bleeding from the incisions that is difficult to control with      light pressure.      If you have loss of feeling or motion.      Any unanswered concern.    For Medical Questions, Please Call:      [phone], Monday - Friday, 8 a.m. - 4:30 p.m.      After hours and on weekends, call Hospital Paging at [phone] and      ask for the Plastic Surgeon on call.      ABDOMINOPLASTY and/or PANNICULECTOMY POST-OPERATIVE INSTRUCTIONS    Instructions   Depending on the complexity of your surgery, you may need to be    admitted to the hospital for a few days.    Have someone drive you home after surgery and help you at home for week.    Get plenty of rest.    Follow balanced diet.    Decreased activity may promote constipation, so you may want to add    more raw fruit to your diet, and be sure to increase fluid intake.    Take pain medication as prescribed. Do not take aspirin or any products     containing aspirin.    Do not drink alcohol when taking pain  medications.    Even when not taking pain medications, no alcohol for 3 weeks as it     causes fluid retention.    Do not smoke, as smoking delays healing and increases the risk of  complications.    If you are provided with an abdominal binder, wear it as instructed. Do     not wear a compression garment unless approved by your physician.    Activities    Turning on your side and pushing off with your arm when getting out of     bed will reduce stress on your incision.    Start walking as soon as possible, as this helps to reduce swelling and     lowers the chance of blood clots.    Do not drive until you are no longer taking any pain medications    (narcotics).     Do not drive until you have full range of motion in your legs and no    discomfort in your abdomen when lifting your legs.    No lifting greater than 5 lbs. for 6 weeks.    Resume sexual activity as comfort permits, usually 2-3 weeks     postoperatively.    Avoid straining of abdominal muscles. Strenuous exercise and activities     are restricted for 6 weeks.    Return to work in 3- 6 weeks as directed by your surgeon.    Incision Care    Your surgeon may use staples or sutures to close your incision. You may also     have 2 to 4 drains inserted following your surgery.    You may shower 48 hours after removal of the drainage tubes. Drains     may stay in for several weeks.    Avoid exposing scars to sun for at least 12 months.    Always use a strong sunblock, if sun exposure is unavoidable (SPF 50 or    greater).    Keep the tape on and allow it to peel off over time.    Keep incisions clean and inspect daily for signs of infection.    Start moisturizing your abdomen and scar by day 10 after surgery.    No tub soaking while sutures or drains are in place.    May wear soft support underpants for comfort; may pad incision with     dressings for comfort.    Sleep with pillow under knees and head elevated on 2 pillows.    No tub soaking, bathing, or  swimming while sutures or drains are in place.    May wear soft support underpants for comfort; may pad incision with    dressings for comfort.    Sleep with pillow under knees and head elevated on 2 pillows.    What to Expect    You may experience temporary pain, soreness, numbness of abdominal     skin, incision discomfort.    Maximum discomfort will occur the first few days.    You will have bruising and swelling of the abdomen. The majority of     bruising and swelling will subside in 6-8 weeks.    You may feel tired for several weeks or months.    One or more of your drains may remain in for several weeks.    Appearance    This procedure only removes the pannus, it does not narrow your    waistline.    You will walk slightly bent forward and gradually return to normal  posture over next 3 weeks.    Scars will be reddened for 6 months. After that, they will fade and soften.    The scar will extend from near one hipbone to the other, low on the  abdomen.     Follow-Up Care    Abdominal drains removed when less than 30 ml for 24 hours.    Usually, absorbable stitches are used. Surface staples (if used) removed in 2       weeks but may remain in longer if there is concern of incision separation.    Please note my office will call you 1-2 business days after your procedure to check up on how you're doing and to schedule your post-operative appointment.     When to Call    If you have increased swelling or bruising.    If swelling and redness persist after a few days.    If you have increased redness along the incision. If you have severe or                increased pain not relieved by medication.    If you have any side effects to medications; such as, rash, nausea,    headache, vomiting.    If you have an oral temperature over 100.4 degrees.    If you have any yellowish or greenish drainage from the incisions or    notice a foul odor.      If you have bleeding from the incisions that is difficult to control with  light              pressure.    If you have loss of feeling or motion.     For Medical Questions, Please Call:    [phone], Monday-Friday, 8 a.m.- 4:30 pm     After hours and on weekends, call Hospital Paging at [phone] and ask for the Plastic Surgeon on call.        [facility]  Same-Day Surgery   Adult Discharge Orders & Instructions   For 24 hours after surgery  1. Get plenty of rest.  A responsible adult must stay with you for at least 24 hours after you leave the hospital.   2. Do not drive or use heavy equipment.  If you have weakness or tingling, don't drive or use heavy equipment until this feeling goes away.  3. Do not drink alcohol.  4. Avoid strenuous or risky activities.  Ask for help when climbing stairs.   5. You may feel lightheaded.  IF so, sit for a few minutes before standing.  Have someone help you get up.   6. If you have nausea (feel sick to your stomach): Drink only clear liquids such as apple juice, ginger ale, broth or 7-Up.  Rest may also help.  Be sure to drink enough fluids.  Move to a regular diet as you feel able.  7. You may have a slight fever. Call the doctor if your fever is over 100 F (37.7 C) (taken under the tongue) or lasts longer than 24 hours.  8. You may have a dry mouth, a sore throat, muscle aches or trouble sleeping.  These should go away after 24 hours.  9. Do not make important or legal decisions.   Call your doctor for any of the followin.  Signs of infection (fever, growing tenderness at the surgery site, a large amount of drainage or bleeding, severe pain, foul-smelling drainage, redness, swelling).    2. It has been over 8 to 10 hours since surgery and you are still not able to urinate (pass water).    3.  Headache for over 24 hours.  Part of your anesthesia included a medication that can make your birth control ineffective.  Use alternate birth control for 1 week.  To contact Dr Muhammad call:  [phone] - during office  hours  [phone] - After office hours, ask for the plastic surgery resident on call

## 2019-12-13 NOTE — ANESTHESIA POSTPROCEDURE EVALUATION
Anesthesia POST Procedure Evaluation    Patient: Meghan Penaloza   MRN:     [id number] Gender:   female   Age:    39 year old :      1980        Preoperative Diagnosis: EXCESS SKIN AND PTOSIS   Procedure(s):  ABDOMINOPLASTY, COSMETIC  BILATERAL MASTOPEXY   Postop Comments: No value filed.       Anesthesia Type:  Not documented  General    Reportable Event: NO     PAIN: Uncomplicated   Sign Out status: Comfortable, Well controlled pain     PONV: No PONV   Sign Out status:  No Nausea or Vomiting     Neuro/Psych: Uneventful perioperative course   Sign Out Status: Preoperative baseline; Age appropriate mentation     Airway/Resp.: Uneventful perioperative course   Sign Out Status: Non labored breathing, age appropriate RR; Resp. Status within EXPECTED Parameters     CV: Uneventful perioperative course   Sign Out status: Appropriate BP and perfusion indices; Appropriate HR/Rhythm     Disposition:   Sign Out in:  PACU  Disposition:  Phase II; Home  Recovery Course: Uneventful  Follow-Up: Not required           Last Anesthesia Record Vitals:  CRNA VITALS  2019 1025 - 2019 1125      2019             Pulse:  83    SpO2:  100 %    Resp Rate (observed):  15          Last PACU Vitals:  Vitals Value Taken Time   /69 2019 11:30 AM   Temp 36.2  C (97.2  F) 2019 10:58 AM   Pulse 77 2019 11:30 AM   Resp 13 2019 11:30 AM   SpO2 100 % 2019 11:30 AM   Temp src     NIBP     Pulse     SpO2     Resp     Temp     Ht Rate     Temp 2           Electronically Signed By: Hamilton Khan MD, 2019, 12:54 PM

## 2019-12-13 NOTE — ANESTHESIA CARE TRANSFER NOTE
Patient: Meghan Penaloza    Procedure(s):  ABDOMINOPLASTY, COSMETIC  BILATERAL MASTOPEXY    Diagnosis: EXCESS SKIN AND PTOSIS  Diagnosis Additional Information: No value filed.    Anesthesia Type:   General     Note:  Airway :Nasal Cannula  Patient transferred to:PACU  Comments: Awake, comfortable, sats 99%, Report to RN.Handoff Report: Identifed the Patient, Identified the Reponsible Provider, Reviewed the pertinent medical history, Discussed the surgical course, Reviewed Intra-OP anesthesia mangement and issues during anesthesia, Set expectations for post-procedure period and Allowed opportunity for questions and acknowledgement of understanding      Vitals: (Last set prior to Anesthesia Care Transfer)    CR[facility] VITALS  12/13/2019 1025 - 12/13/2019 1101      12/13/2019             Pulse:  83    SpO2:  100 %    Resp Rate (observed):  15                Electronically Signed By: DANIEL Amos CRNA  December 13, 2019  11:01 AM

## 2019-12-13 NOTE — OP NOTE
PREOPERATIVE DIAGNOSIS:  Symptomatic excess abdominal wall skin.        POSTOPERATIVE DIAGNOSIS:  Symptomatic excess abdominal wall skin.        PROCEDURE: 1.  Cosmetic abdominoplasty and panniculectomy.  2. COSMETIC Bilateral superomedial pedicle inverted T skin closure mastopexy.      SURGEON:  Shanique Muhammad MD      FELLOW: True Santamaria MD      ANESTHESIA:  General anesthesia with endotracheal intubation.        COMPLICATIONS:  Nil.        DRAINS:  None      SPECIMENS:  Bilateral breast tissue: Right: 152 gms, Left: 233 gms.    BLOOD LOSS: 150 mL        DESCRIPTION OF PROCEDURE:  After informed consent was taken from the patient, the proper site and procedure was ascertained with the patient, the patient was appropriately marked and taken to the operating room.  She was placed in a supine position with her knees comfortably flexed, pillows underneath them and pneumoboots placed and running prior to induction of anesthesia.  Preoperative antibiotics and Heparin were given in the OR.  All pressure points were appropriately padded.  General anesthesia was administered without any complications. Her entire chest, abdomen, and upper thigh and groin area were prepped and draped in a standard surgical fashion.  She had preoperatively been marked for her abdominoplasty.  I went ahead and remarked the areas.  I then went ahead and made my inferior incision marked out by potential inferior incision by lifting up on the mons as well as on the lateral abdominal region on each side and while doing that made a symmetric marking about 7 cm from the external genitalia and a few centimeters from the pubic tubercle in the mons area.  A horizontal marking was made symmetrically on each side and then within the hair bearing area and then from that lateral aspect of each line marked out passing through the plane of anterior superior iliac spine to join the preop markings of the lateral extent of the excision.  Once this was done, I  then made my incision, dissected using electrocautery, dissected through the subcutaneous tissues down to the Dania's layer, elevated the skin and subcutaneous tissue in a cephalad direction superficial to the Dania's layer until I was outside the groin and then dove deep to the Dania's layer and elevated the skin and subcutaneous tissue in a cephalad direction just anterior to the anterior abdominal wall, leaving a thin layer of loose areolar tissue on the anterior abdominal wall.  All of this was done in an effort to decrease seroma formation.  During this dissection any large perforators were clipped, ligated and controlled.  Dissection was carried out in a cephalad direction elevating the skin and subcutaneous tissues towards the umbilical plane.  Prior to reaching the umbilicus, I then turned my attention to the umbilicus.  I went ahead and placed a 12 o'clock and 6 o'clock suture and then cut around the umbilicus.  I dissected along the stalk down to the anterior abdominal wall, taking care to feel for any obvious hernia and none was felt.  The lower skin flap was then divided and dissection was carried out around the umbilicus cephalad to the umbilicus I continued my dissection towards the xiphoid in the midline and to the costal margins laterally.  The further lateral I went the less dissection I did to keep the zone 3 intact.  Once dissection in a cephalad direction was done to a point where I felt that I could get plication as well as appropriate redraping of the abdominal wall I stopped this dissection.  I ensured hemostasis.  I then plicated the anterior abdominal wall.  I marked the midline then estimated the redundancy.  I made sure that the patient was relaxed and then used multiple 0 Ethibond suture in an interrupted fashion to plicate the anterior abdominal rectus sheath.  Once this was completed, I then turned my attention to the umbilicus.  I trimmed it appropriately, then tacked down the 3  o'clock, 6 o'clock and 9 o'clock portions to the anterior abdominal wall. I then turned my attention to the excess skin of the anterior abdominal wall.  I flexed the patient to about 30 degrees and then estimated the excess skin and excised it.  Again, hemostasis was ensured.  Once this was completed, I then marked the new position where the umbilicus would come through. I then used 2-0 Strattafix sutures to quilt the abdominal wall.  I then closed the incision using #0 PDS suture in the superficial fascial system followed by 2-0 Monocryl sutures for the deep dermal layer and then 3-0 Monocryl Strattafix in a running intracuticular manner. The umbilical opening was then made and the umbilicus was sewn in with 2-0 Monocryl sutures and 4-0 plain gut sutures.    I then turned my attention to the breasts.  I began by first remarking the preop markings and marking a 42 mm areola on each side. I then marked out a superior medial pedicle on each side. I then de-epithelialized the pedicle on each side. I then dissected out the pedicle on each side without actually seeing the deep fascia and also released the pedicle such that it could rotate into its new nipple position without any tension. I then went ahead and on each side excised the inferomedial, inferior, inferolateral and lateral aspects of the breast according to a vertical pattern reduction. Strict hemostasis was ensured during this entire part of the case. Once this was done, I then temporarily closed the patient's breast in an inverted T fashion, sat the patient up ensured symmetry and then marked out the new areolar opening symmetrically on each side. I then went ahead and closed the horizontal incision using 2-0 Monocryl suture in a deep dermal layer. Then I made sure that the nipple areolar complex could be retrieved without any tension, which is could on each side. I then checked that they were both pink and viable, which they were. I then went ahead and  excised the skin of the new areolar opening and de-epithelialized epithelialized the portion that was involved in the pedicle on each side. I then sutured in the nipple areolar complex using 2-0 Monocryl suture in a deep dermal fashion circumferentially. I then closed the vertical incision using 2-0 Monocryl suture to approximate the medial and lateral pillars, and then the deep dermis. I then ran all the incisions with 3-0 Monocryl suture in a running intracuticular manner followed by placement of surgical tape and glue , and then followed by an ACE wrap. At the end of the case, the patient's breasts were soft, nipples were pink, and breasts were symmetric.     The umbilicus was covered with antibiotic ointment and a dry dressing. Surgical tape and glue was placed over the abdominal incision. An abdominal binder was placed.  The patient tolerated the procedure well.  All counts correct at the end of the case.  The patient was extubated and transferred to a hospital bed in a flexed position.  She was then transferred to the PACU in a stable condition.  All counts were correct at the end of the case.    .

## 2019-12-13 NOTE — OR NURSING
Lovenox was ordered in PreOP orders. Not available in Pyxis at this time. Pharmacy not available at this time. Dr. Muhammad informed and ordered Heparin 5000units subcutaneous x1 as alternative. Administered in PreOP. Pt has allergy to PCN. Ancef alert generated, Dr. Muhammad informed; OK'd to proceed with Ancef for antibiotic.

## 2019-12-17 ENCOUNTER — DOCUMENTATION ONLY (OUTPATIENT)
Dept: OTHER | Facility: CLINIC | Age: 39
End: 2019-12-17

## 2019-12-17 LAB — COPATH REPORT: NORMAL

## 2019-12-17 NOTE — PROGRESS NOTES
Service Date: 2019      PRESENTING COMPLAINT:  Postoperative visit, status post cosmetic abdominoplasty, panniculectomy and bilateral mastopexy done 2019.      HISTORY OF PRESENTING COMPLAINT:  Ms. Turner is 39 years old.  She had her surgery done this morning.  She had some concerns about some blood soaked wrap and therefore we had her come in just to make sure she was doing okay.      PHYSICAL EXAMINATION:  Vital signs are stable.  She is afebrile, in no obvious distress.  Incisions are intact.  No evidence for hematoma.  Slight drainage from the periumbilical incision.      ASSESSMENT AND PLAN:  Based on above findings, a diagnosis of cosmetic bilateral mastopexy and abdominoplasty was made.  I reassured her everything is healing in well.  Replaced the dressings.  I will see her back in a week's time.         KHAI GODINEZ MD             D: 2019   T: 2019   MT: [email or web address]      Name:     KATIA TURNER   MRN:      [id number]        Account:      [id number]   :      1980           Service Date: 2019      Document: [id number]

## 2019-12-19 ENCOUNTER — TELEPHONE (OUTPATIENT)
Dept: SURGERY | Facility: CLINIC | Age: 39
End: 2019-12-19

## 2019-12-19 NOTE — TELEPHONE ENCOUNTER
"Pt called s/se melendez on 12/13/19 to report that she slept on the couch last night instead of the recliner and thinks she may have slept wrong and her left shoulder area is sore. Pt states that she is worried about blood clots. RN asked pt if she can palpate the area and if it is sore when she touches it and pt states \"yes it is\". RN asked pt if she was sob or having any chest discomfort and pt denied these sx. Pt states \"yes that makes sense that its probably from sleeping on it\". Pt also said she noticed the tape to her umbilical area started to cause irritation to the skin and some blisters formed. Pt states that she removed it and replaced it with another kind of tape. RN advised pt to avoid using tape and just apply a dressing to the area if needed and moisturize with Vaseline. Pt verbalized understanding and also informs RN that after she is in the shower she gets dizzy at times but then once she puts the binder on she feels fine. RN instructed pt to have someone assist her when she is in the shower to avoid falling and to move slowly. Pt states that she does have someone that can help her. RN advised pt if any of the above sx worsen to call the clinic back. Pt has appt tomorrow with  and pt aware of this...Nishi Leone RN    "

## 2019-12-20 ENCOUNTER — OFFICE VISIT (OUTPATIENT)
Dept: SURGERY | Facility: CLINIC | Age: 39
End: 2019-12-20
Payer: COMMERCIAL

## 2019-12-20 DIAGNOSIS — Z98.890 S/P ABDOMINOPLASTY: Primary | ICD-10-CM

## 2019-12-20 PROCEDURE — 99024 POSTOP FOLLOW-UP VISIT: CPT | Performed by: PLASTIC SURGERY

## 2019-12-20 ASSESSMENT — PAIN SCALES - GENERAL: PAINLEVEL: MILD PAIN (2)

## 2019-12-20 NOTE — H&P
PRESENTING COMPLAINT:  Consultation for breast lift and liposuction and abdominal contouring surgery.      HISTORY OF PRESENTING COMPLAINT:  Ms. Penaloza is 38 years old.  She has been thinking about a breast lift small reduction for some time and is also concerned about the excess skin and adiposity in her abdomen, flanks and lateral thighs and medial thigh regions.  She is here to discuss options for treating these areas.  She has lost about 25 pounds over the last year through diet and exercise and wants to lose another 20-25 pounds.  She wears an E cup bra.  She would like to be around a large D cup, but most importantly would like to be more lifted.  She has no family history or personal history of breast cancer.  She has never had a mammogram.  Additionally, she is unhappy with the adiposity in the area described above.      PAST MEDICAL HISTORY:  None.      PAST SURGICAL HISTORY:  Oral surgery.      MEDICATIONS:  None.      ALLERGIES:   Penicillin (rash).  Amoxicillin nausea, vomiting.      SOCIAL HISTORY:  Does not smoke, does not drink.  Lives in [place], is a .      REVIEW OF SYSTEMS:  Denies chest pain, shortness of breath, MI, CVA, DVT and PE.      PHYSICAL EXAMINATION:  Vital signs are stable.  She is afebrile, in no obvious distress.  She is 5 feet, 3-1/2 inches, 174 pounds, BMI of 30 kg/m2.  On examination of her breasts, she has grade 2-3 ptosis.  Left breast is larger and lower than the right breast.  Sternal to brfjb-hm-lbinyd distance under 35 cm.  On examination of her abdomen, she has a moderate lower abdominal panniculus that does not hang to the pubic symphysis.  She has a significant adiposity in the subcutaneous planes. Skin pinch thickness about 6 cm. In the upper abdomen, she has a minimal overhang with excess adiposity of about 4 cm. No hernias and the abdominal wall has minimal laxity. Her flanks do also have some adiposity in the lateral thighs and medial thighs have  adiposity.     Chest; Clear  CVS: RRR     ASSESSMENT AND PLAN:  Based on above findings, a diagnosis of bilateral breast ptosis and hypertrophy, lipodystrophy of the abdomen, flanks and thighs and excess skin of the abdominal wall was made.  Discussed with the patient her findings.  I think she would benefit the most from a small breast reduction/breast lift as well as an abdominoplasty and liposuction of her flanks, thighs and conservative liposuction of the anterior abdominal wall if a simultaneous abdominoplasty is done.  Otherwise, if the liposuction is a staged then aggressive liposuction could be done throughout these areas.  The patient wants to think about it but most likely wants to proceed with a breast lift and abdominoplasty in the first stage and then the abdominal flank and thigh liposuction as the second stage.  I think that is reasonable as long as the patient keeps a stable weight.  I was very clear about getting to her goal weight and then having these procedures done.  I gave her an overview of what to expect from these surgeries.  All risks, benefits, alternatives of the procedures including pain, infection, bleeding, scarring, asymmetry, seromas, hematomas, wound breakdown, wound dehiscence, sensation loss in the nipple, numbness, asymmetries, inability to breast-feed, prominent scar, hypertrophic scar, keloid scar, injury to deeper structures like nerves, vessels and bowel, umbilical necrosis, skin necrosis, contour irregularities, DVT, PE, MI, CVA, pneumonia and death were explained.  She understood everything and wants to think about it and let me know.  Consent obtained.  Photographs obtained.  I gave her a quote.  I look forward to helping her out in the near future.  If she does proceed with surgery, she will need a mammogram.

## 2019-12-20 NOTE — LETTER
2019         RE: Meghan Turner  [address] [facility] [address]        Dear Colleague,    Thank you for referring your patient, Meghan Turner, to the [facility]. Please see a copy of my visit note below.    Service Date: 2019      PRESENTING COMPLAINT:  Postoperative visit, status post cosmetic abdominoplasty, panniculectomy and bilateral mastopexy done 2019.      HISTORY OF PRESENTING COMPLAINT:  Ms. Turner is 39 years old.  She is a week out from surgery and has done extremely well, very happy with the results, no issues.  Pain is well controlled.      PHYSICAL EXAMINATION:  Vital signs are stable.  She is afebrile, in no obvious distress.  Everything is healing in well.      ASSESSMENT AND PLAN:  Based on above findings, a diagnosis of cosmetic bilateral mastopexy and abdominoplasty was made.  I advised aggressive moisturization and allow everything to heal.  No heavy activities for a couple more weeks.  I will see her back in 2-3 weeks.         KHAI MUHAMMAD MD             D: 2019   T: 2019   MT: freddie      Name:     MEGHAN TURNER   MRN:      [id number]        Account:      [id number]   :      1980           Service Date: 2019      Document: [id number]       Again, thank you for allowing me to participate in the care of your patient.        Sincerely,        KHAI Muhammad MD

## 2019-12-20 NOTE — NURSING NOTE
Meghan Penaloza's goals for this visit include:   Chief Complaint   Patient presents with     RECHECK     post op panniculectomy       She requests these members of her care team be copied on today's visit information: Yes    PCP: Jacinda Beach    Referring Provider:  No referring provider defined for this encounter.    There were no vitals taken for this visit.    Do you need any medication refills at today's visit? No    Marcie Hernandez LPN

## 2019-12-24 ENCOUNTER — TELEPHONE (OUTPATIENT)
Dept: SURGERY | Facility: CLINIC | Age: 39
End: 2019-12-24

## 2019-12-24 NOTE — PROGRESS NOTES
Service Date: 2019      PRESENTING COMPLAINT:  Postoperative visit, status post cosmetic abdominoplasty, panniculectomy and bilateral mastopexy done 2019.      HISTORY OF PRESENTING COMPLAINT:  Ms. Turner is 39 years old.  She is a week out from surgery and has done extremely well, very happy with the results, no issues.  Pain is well controlled.      PHYSICAL EXAMINATION:  Vital signs are stable.  She is afebrile, in no obvious distress.  Everything is healing in well.      ASSESSMENT AND PLAN:  Based on above findings, a diagnosis of cosmetic bilateral mastopexy and abdominoplasty was made.  I advised aggressive moisturization and allow everything to heal.  No heavy activities for a couple more weeks.  I will see her back in 2-3 weeks.         KHAI GODINEZ MD             D: 2019   T: 2019   MT: freddie      Name:     KATIA TURNER   MRN:      [id number]        Account:      [id number]   :      1980           Service Date: 2019      Document: [id number]

## 2019-12-24 NOTE — TELEPHONE ENCOUNTER
Pt called to report some itching that she thinks is just related to the healing. S/P panniculectomy on 12/13/19 and is wondering if okay to take Benadryl. RN advised ok to take Benadryl but that it can cause drowsiness so RN advised pt to take Claritin or Zyrtec otc during the day and Benadryl at night. RN also advised pt to continue to moisturize skin throughout the day. Pt verbalized understanding and had no further questions..Nishi Leone RN

## 2019-12-26 ENCOUNTER — TELEPHONE (OUTPATIENT)
Dept: SURGERY | Facility: CLINIC | Age: 39
End: 2019-12-26

## 2019-12-26 NOTE — TELEPHONE ENCOUNTER
"Description:  Drainage, yellow and thick of the belly button  Onset/duration:  Persistently draining since surgery; recently drainage has turned thick and yellow. \"Today is the first time I couldn't see through my belly button.\"  Associated symptoms:  Thick yellow draining causing irritation and redness.  Pain scale (0-10)  0  Last exam/Treatment/Date of surgery:  12/13/2019    Taking medication(s) as prescribed? No. No medications were provided at the time of surgery and patient is not applying any topical over the counters at this time.  Taking over the counter medication(s?) No  Any medication side effects? No significant side effects    Medication(s) improving/managing symptoms?  N/A    Will route this message to plastics and to RN on the floor to review      [phone] Okay to leave detailed message at this number.    Mary Delgado, MARC    "

## 2019-12-26 NOTE — TELEPHONE ENCOUNTER
Received return call from patient. Patient reports that she has had ongoing drainage at the belly button site but this morning the drainage has turned thick and yellow. Patient denies odor to drainage. Per patient, redness noted surrounding the belly button and patient unsure is area of redness has increased warmth to the touch. Patient denies fevers, chills, and pain. Patient reports that she had been doing dressing changes every other day but will change it daily now because of the drainage. Informed patient that Dr. Muhammad's team is out of the office today but a message will be sent for the team to follow up tomorrow. Informed patient to call or seek care at urgent care if she develops fevers, chills, sudden large increase in drainage, increased area of redness, or pain. Informed patient to keep area clean and dry, complete dressing change daily and as needed for increased drainage. Patient verbalized understanding and was comfortable with plan.    Connie Trotter RN, BSN

## 2019-12-26 NOTE — TELEPHONE ENCOUNTER
Writer left a voice message on Meghan KHAI Penaloza's phone to call back to discuss symptoms. Clinic number provided to call back.    Mary Delgado LPN

## 2019-12-26 NOTE — TELEPHONE ENCOUNTER
Patient left message stating that she had a tummy tuck with Dr. Muhammad on 12/13 and she has noticed that it is still draining. Pt reports that area is now looking reddish. She is covering the wound and cleaning it, but she is looking for advise as to whether she should be doing anything else.  Erika Garcia, NETTEN

## 2019-12-26 NOTE — TELEPHONE ENCOUNTER
Attempted to reach patient by phone, but no answer. Left generic message with request to return call to clinic.     Connie Trotter RN, BSN

## 2019-12-30 NOTE — TELEPHONE ENCOUNTER
"Pt returned call to clinic. Patient stated she noticed an increase in thickness and yellow color in the drainage from her belly button. Writer asked if she had any fever like symptoms pt stated no. Pt explained she \" pulled some of the drainage out\" and notice it bled a little bit and is red around the area. Writer explained she would have MAR Almodovar give her a call back to discuss.    Marcie Hernandez LPN    "

## 2019-12-30 NOTE — TELEPHONE ENCOUNTER
Florence advised that Dr. Gomez could see pt tomorrow at 9:45am at the [facility]. RN called and pt agrees with this plan. Florence notified via staff message that pt would like this appt...Nishi Leone RN

## 2019-12-30 NOTE — TELEPHONE ENCOUNTER
"Pt called and states that she thinks the drainage from her abdominal incision site is thicker than it was previously. Pt reports that its almost seems like a \"gel\" consistency. Pt states that she has not been moisturizing the site since she was last seen.  Skin surrounding the site is red but redness has not increased per pt. No increased tenderness, no fevers no general ill feeling per pt. RN advised pt that  is not in clinic this week but RN would  look into a covering provider. Pt verbalized understanding. RN reached out to Florence MANUEL at the [facility] to see if she can advise on a covering provider. Florence states that she will check with Dr. Grant and Dr. Gomez who are in clinic tomorrow and get back to RN...Nishi Leone RN    "

## 2019-12-31 ENCOUNTER — OFFICE VISIT (OUTPATIENT)
Dept: PLASTIC SURGERY | Facility: CLINIC | Age: 39
End: 2019-12-31
Payer: COMMERCIAL

## 2019-12-31 DIAGNOSIS — Z98.890 POSTOPERATIVE STATE: Primary | ICD-10-CM

## 2019-12-31 DIAGNOSIS — Z98.890 S/P ABDOMINOPLASTY: ICD-10-CM

## 2019-12-31 ASSESSMENT — PAIN SCALES - GENERAL: PAINLEVEL: NO PAIN (0)

## 2019-12-31 NOTE — NURSING NOTE
Chief Complaint   Patient presents with     Surgical Followup     Pt here for post op visit       There were no vitals filed for this visit.    There is no height or weight on file to calculate BMI.      CHRISTOPHER Welsh NREMT                    No vitals taken per provider

## 2019-12-31 NOTE — LETTER
"12/31/2019       RE: Meghan Penaloza  [address] [facility]  [place] [address]     Dear Colleague,    Thank you for referring your patient, Meghan Penaloza, to the [facility] PLASTIC AND RECONSTRUCTIVE SURGERY at [facility]. Please see a copy of my visit note below.    PLASTICS POSTOP FOLLOW UP    This is a 39 year old patient of Dr Muhammad's who underwent abdominoplasty and mastopexy at [place] on 12/13.  She was seen 1 week postop by Dr Muhammad, at which time things appeared to be healing well.   She then contacted nursing staff at  on 12/26 and 12/30 to report that there was some redness, bleeding and increased drainage from her umbilical site.  We were able to see the patient today in our clinic.  She denies any systemic signs of infection, and notes that the redness has improved since her first report.   She is still experiencing some breast hypersensitivity, but her abdomen is feeling better and she is only taking Tylenol for discomfort.     On exam, she has a very nice healing panniculectomy incision. There is minimal redness of the skin flap. There is a hyperpigmented avinash in the RUQ from tape, but the skin is dry and intact.   There is a gelatinous \"clot\" within the umbilical site that the patient was concerned about. This was gently removed with forceps and the underlying umbilical skin appeared viable and intact. There is no evidence of infection or ischemic changes. The umbilicus was lightly \"packed\" with the corner of a soft 2x2 gauze and secured with paper tape. She had only been placing the gauze over the top of the umbilical site. She put on her abdominal binder without difficulty and arose from the exam table taking great care to avoid undue stress to her truncal region.     She will continue her current cares and can contact us if signs of infection or problems with healing should occur. I assured her that everything looks great so far. She has a follow up appointment " with Dr Muhammad in about 3 weeks.    Again, thank you for allowing me to participate in the care of your patient.      Sincerely,    Jana Gomez MD

## 2020-01-01 NOTE — PROGRESS NOTES
"PLASTICS POSTOP FOLLOW UP    This is a 39 year old patient of Dr Muhammad's who underwent abdominoplasty and mastopexy at [place] on 12/13.  She was seen 1 week postop by Dr Muhammad, at which time things appeared to be healing well.   She then contacted nursing staff at  on 12/26 and 12/30 to report that there was some redness, bleeding and increased drainage from her umbilical site.  We were able to see the patient today in our clinic.  She denies any systemic signs of infection, and notes that the redness has improved since her first report.   She is still experiencing some breast hypersensitivity, but her abdomen is feeling better and she is only taking Tylenol for discomfort.     On exam, she has a very nice healing panniculectomy incision. There is minimal redness of the skin flap. There is a hyperpigmented avinash in the RUQ from tape, but the skin is dry and intact.   There is a gelatinous \"clot\" within the umbilical site that the patient was concerned about. This was gently removed with forceps and the underlying umbilical skin appeared viable and intact. There is no evidence of infection or ischemic changes. The umbilicus was lightly \"packed\" with the corner of a soft 2x2 gauze and secured with paper tape. She had only been placing the gauze over the top of the umbilical site. She put on her abdominal binder without difficulty and arose from the exam table taking great care to avoid undue stress to her truncal region.     She will continue her current cares and can contact us if signs of infection or problems with healing should occur. I assured her that everything looks great so far. She has a follow up appointment with Dr Muhammad in about 3 weeks.  "

## 2020-01-24 ENCOUNTER — OFFICE VISIT (OUTPATIENT)
Dept: SURGERY | Facility: CLINIC | Age: 40
End: 2020-01-24
Payer: COMMERCIAL

## 2020-01-24 DIAGNOSIS — Z98.890 S/P ABDOMINOPLASTY: Primary | ICD-10-CM

## 2020-01-24 PROCEDURE — 99024 POSTOP FOLLOW-UP VISIT: CPT | Performed by: PLASTIC SURGERY

## 2020-01-24 NOTE — PROGRESS NOTES
PRESENTING COMPLAINT:  Postoperative visit, status post cosmetic abdominoplasty, panniculectomy and bilateral mastopexy done 12/13/2019.      HISTORY OF PRESENTING COMPLAINT:  Ms. Penaloza is 39 years old.  She is 6 weeks out from surgery and has done extremely well, very happy with the results, no issues.  Healed.     PHYSICAL EXAMINATION:  Vital signs are stable.  She is afebrile, in no obvious distress.  Everything is healed.      ASSESSMENT AND PLAN:  Based on above findings, a diagnosis of cosmetic bilateral mastopexy and abdominoplasty was made.  I advised aggressive moisturization. No restrictions.  I will see her back prn.

## 2020-01-24 NOTE — LETTER
1/24/2020         RE: Meghan Penaloza  [address] [facility] [facility] [address]        Dear Colleague,    Thank you for referring your patient, Meghan Penaloza, to the [facility]. Please see a copy of my visit note below.    PRESENTING COMPLAINT:  Postoperative visit, status post cosmetic abdominoplasty, panniculectomy and bilateral mastopexy done 12/13/2019.      HISTORY OF PRESENTING COMPLAINT:  Ms. Penaloza is 39 years old.  She is 6 weeks out from surgery and has done extremely well, very happy with the results, no issues.   Healed.     PHYSICAL EXAMINATION:  Vital signs are stable.  She is afebrile, in no obvious distress.  Everything is healed.      ASSESSMENT AND PLAN:  Based on above findings, a diagnosis of cosmetic bilateral mastopexy and abdominoplasty was made.  I advised aggressive moisturization. No  restrictions.  I will see her back prn.          Again, thank you for allowing me to participate in the care of your patient.        Sincerely,        KHAI Muhammad MD

## 2020-01-24 NOTE — NURSING NOTE
Per Dr. Muhammad bilateral breast and abdomen pictures to be taken. Pt identifier picture taken first, then arms to sides, arms on hips, 45 and 90 degree angle on both sides....Nishi Leone RN

## 2020-01-24 NOTE — NURSING NOTE
Meghan Penaloza's goals for this visit include:   Chief Complaint   Patient presents with     RECHECK     tummy tuck and breast reduction       She requests these members of her care team be copied on today's visit information: no    PCP: Jacinda Beach    Referring Provider:  KHAI Muhammad MD  [address] [place] [address]    There were no vitals taken for this visit.    Do you need any medication refills at today's visit? No    Ananya Cavazos LPN

## 2020-02-16 ENCOUNTER — HEALTH MAINTENANCE LETTER (OUTPATIENT)
Age: 40
End: 2020-02-16

## 2020-03-09 ENCOUNTER — TELEPHONE (OUTPATIENT)
Dept: SURGERY | Facility: CLINIC | Age: 40
End: 2020-03-09

## 2020-03-09 NOTE — TELEPHONE ENCOUNTER
Pt called stating that she would like to schedule the liposuction to her lower extremities. Pt last seen with  back on 1/24/20.  Pt states that she was given a quote for it back in June of 2019. RN noted quote is the same price as current price on cosmetic price sheet. Pt would like to schedule this on 5/15/20 if possible. RN will route to  to advise if okay to schedule or if he would like to see pt in clinic first..Nishi Leone RN

## 2020-03-10 NOTE — TELEPHONE ENCOUNTER
Pt called, no answer. VM Id's pt. Left detailed message with reason for call and  for pt to call the [facility] back at [phone].....KHAI Lanier RN, MD Eastman, Colleen E RN               See her again     Thanks     Shanique    Previous Messages     ----- Message -----   From: Nishi Leone RN   Sent: 3/9/2020   4:07 PM CDT   To: KHAI Muhammad MD      Pt had a  consult visit in June of 2019 she was given a quote for  abdominoplasty and mastopexy  and Liposuction to her abdomen, flank and lower extremities. She had the abdominoplasty and mastopexy on 12/29/19. Pt is wanting to schedule Liposuction to her lower extremities. Quote is the same as given back in June. Do you want to see her first or ok to schedule?       Thank you     Nishi

## 2020-03-10 NOTE — TELEPHONE ENCOUNTER
Pt left a vm okay that she is okay with seeing  first but would like to know if he could potentially put the date 5/15 on hold for now. RN will route a message to  to advise and will flag for staff to contact pt to schedule an appt with ...Nishi Leone RN

## 2020-03-11 NOTE — TELEPHONE ENCOUNTER
Pt called and asked if 5/13 or 5/18 would work and  responded via staff message that these dates will not work as he has flap cases on both these dates. RN called pt and pt notified and asked if RN knew of any other dates. RN notified pt that RN does not know as RN does not manage his surgery schedule. RN offered a sooner appt this Friday for pt to come in to discuss the surgery. Pt agrees with this and is scheduled this Friday 3/13 with ...Nishi Leone RN

## 2020-03-11 NOTE — TELEPHONE ENCOUNTER
RN received a staff message back from  that he will be out of town 5/15. Pt called, no answer. VM Id's pt. Left detailed message with reason for call and to see if pt would like to potentially hold a different date for surgery and also schedule an appt with  to discuss surgery. Pt advised to call the [facility] back at [phone]....Nishi Leone RN

## 2020-11-22 ENCOUNTER — HEALTH MAINTENANCE LETTER (OUTPATIENT)
Age: 40
End: 2020-11-22

## 2021-04-04 ENCOUNTER — HEALTH MAINTENANCE LETTER (OUTPATIENT)
Age: 41
End: 2021-04-04

## 2021-09-18 ENCOUNTER — HEALTH MAINTENANCE LETTER (OUTPATIENT)
Age: 41
End: 2021-09-18

## 2021-09-27 ENCOUNTER — OFFICE VISIT (OUTPATIENT)
Dept: UROLOGY | Facility: CLINIC | Age: 41
End: 2021-09-27
Payer: COMMERCIAL

## 2021-09-27 VITALS
OXYGEN SATURATION: 98 % | BODY MASS INDEX: 34.15 KG/M2 | DIASTOLIC BLOOD PRESSURE: 70 MMHG | HEIGHT: 64 IN | SYSTOLIC BLOOD PRESSURE: 120 MMHG | WEIGHT: 200 LBS | HEART RATE: 76 BPM

## 2021-09-27 DIAGNOSIS — N13.4 HYDROURETER: Primary | ICD-10-CM

## 2021-09-27 LAB
ANION GAP SERPL CALCULATED.3IONS-SCNC: 7 MMOL/L (ref 3–14)
BUN SERPL-MCNC: 12 MG/DL (ref 7–30)
CALCIUM SERPL-MCNC: 8.3 MG/DL (ref 8.5–10.1)
CHLORIDE BLD-SCNC: 109 MMOL/L (ref 94–109)
CO2 SERPL-SCNC: 17 MMOL/L (ref 20–32)
CREAT SERPL-MCNC: 0.66 MG/DL (ref 0.52–1.04)
GFR SERPL CREATININE-BSD FRML MDRD: >90 ML/MIN/1.73M2
GLUCOSE BLD-MCNC: 85 MG/DL (ref 70–99)
POTASSIUM BLD-SCNC: 4.7 MMOL/L (ref 3.4–5.3)
SODIUM SERPL-SCNC: 133 MMOL/L (ref 133–144)

## 2021-09-27 PROCEDURE — 80048 BASIC METABOLIC PNL TOTAL CA: CPT | Performed by: STUDENT IN AN ORGANIZED HEALTH CARE EDUCATION/TRAINING PROGRAM

## 2021-09-27 PROCEDURE — 36415 COLL VENOUS BLD VENIPUNCTURE: CPT | Performed by: STUDENT IN AN ORGANIZED HEALTH CARE EDUCATION/TRAINING PROGRAM

## 2021-09-27 PROCEDURE — 99203 OFFICE O/P NEW LOW 30 MIN: CPT | Performed by: STUDENT IN AN ORGANIZED HEALTH CARE EDUCATION/TRAINING PROGRAM

## 2021-09-27 RX ORDER — TAMSULOSIN HYDROCHLORIDE 0.4 MG/1
CAPSULE ORAL
COMMUNITY
Start: 2021-09-24

## 2021-09-27 ASSESSMENT — PAIN SCALES - GENERAL: PAINLEVEL: NO PAIN (0)

## 2021-09-27 ASSESSMENT — MIFFLIN-ST. JEOR: SCORE: 1557.19

## 2021-09-27 NOTE — LETTER
9/27/2021       RE: eMghan Penaloza  [address]  [place] [address]     Dear Colleague,    Thank you for referring your patient, Meghan Penaloza, to the [facility]IC [place] at [facility]. Please see a copy of my visit note below.    [facility]  Main Office: [address]  [address]  [place] [address]       CHIEF COMPLAINT:  Left hydroureteronephrosis    HISTORY:   39 yo F here with left hydroureteronephrosis    Four days ago had left flank pain, went to outside ED. Had non contrast scan followed up by CT abd/pelvis with IV contrast    Saw Dr. Aldana who recommended operative intervention this week    Has not seen any gross hematuria    Denies any history of cancer    Symptoms have improved over the weekend, essentially asymptomatic now.    PAST MEDICAL HISTORY: History reviewed. No pertinent past medical history.    PAST SURGICAL HISTORY:   Past Surgical History:   Procedure Laterality Date     COSMETIC ABDOMINOPLASTY N/A 12/13/2019    Procedure: ABDOMINOPLASTY, COSMETIC;  Surgeon: KHAI Muhammad MD;  Location: [facility] OR     MASTOPEXY Bilateral 12/13/2019    Procedure: BILATERAL MASTOPEXY;  Surgeon: KHAI Muhammad MD;  Location: [facility] OR       FAMILY HISTORY: History reviewed. No pertinent family history.    SOCIAL HISTORY:   Social History     Tobacco Use     Smoking status: Never Smoker     Smokeless tobacco: Never Used   Substance Use Topics     Alcohol use: Not on file          Allergies   Allergen Reactions     Amoxicillin GI Disturbance     Penicillin G Rash         Current Outpatient Medications:      tamsulosin (FLOMAX) 0.4 MG capsule, TAKE 1 CAPSULE BY MOUTH EVERY DAY, Disp: , Rfl:     Review Of Systems:  Skin: No rash, pruritis, or skin pigmentation  Eyes: No changes in vision  Ears/Nose/Throat: No changes in hearing, no nosebleeds  Respiratory: No shortness of breath, dyspnea on exertion, cough, or  "hemoptysis  Cardiovascular: No chest pain or palpitations  Gastrointestinal: No diarrhea or constipation. No abdominal pain. No hematochezia  Genitourinary: see HPI  Musculoskeletal: No pain or swelling of joints, normal range of motion  Neurologic: No weakness or tremors  Psychiatric: No recent changes in memory or mood  Hematologic/Lymphatic/Immunologic: No easy bruising or enlarged lymph nodes  Endocrine: No weight gain or loss      PHYSICAL EXAM:    /70   Pulse 76   Ht 1.626 m (5' 4\")   Wt 90.7 kg (200 lb)   SpO2 98%   BMI 34.33 kg/m    General appearance: In NAD, conversant  HEENT: Normocephalic and atraumatic, anicteric sclera  Cardiovascular: Not examined  Respiratory: normal, non-labored breathing  Back: no CVAT  Gastrointestinal: negative, Abdomen soft, non-tender, and non-distended.   Musculoskeletal: Not Examined  Peripheral Vascular/extremity: No peripheral edema  Skin: Normal temperature, turgor, and texture. No rash  Psychiatric: Appropriate affect, alert and oriented to person, place, and time    Cystoscopy: Not done      UA RESULTS:  No results for input(s): COLOR, APPEARANCE, URINEGLC, URINEBILI, URINEKETONE, SG, UBLD, URINEPH, PROTEIN, UROBILINOGEN, NITRITE, LEUKEST, RBCU, WBCU in the last 46425 hours.    Bladder Scan:     Other Labs:      Imaging Studies:     CT abd/pelvis w/o contrast    I reviewed the outside imaging personally        mild left hydroureteronephrosis down to the distal ureter. I do not see any obvious stone or obstructing mass      CLINICAL IMPRESSION:   41 yo F here with left flank pain, found to have left hydroureteronephrosis without obvious cause of obstruction in the distal ureter. On my review of the imaging I do not see a ureteral mass. The imaging available does not have a delayed film to follow the course of the ureter. Possible that she passed a stone and that the imaging from last week shortly after stone passage showed some residual hydro    PLAN:   Get a " CT urogram and a BMP  Follow up afterwards      Neftali Nguyen MD   [facility]  [phone] clinic phone

## 2021-09-27 NOTE — PROGRESS NOTES
[facility]  Main Office: [address]  [address]  [place] [address]       CHIEF COMPLAINT:  Left hydroureteronephrosis    HISTORY:   41 yo F here with left hydroureteronephrosis    Four days ago had left flank pain, went to outside ED. Had non contrast scan followed up by CT abd/pelvis with IV contrast    Saw Dr. Aldana who recommended operative intervention this week    Has not seen any gross hematuria    Denies any history of cancer    Symptoms have improved over the weekend, essentially asymptomatic now.    PAST MEDICAL HISTORY: History reviewed. No pertinent past medical history.    PAST SURGICAL HISTORY:   Past Surgical History:   Procedure Laterality Date     COSMETIC ABDOMINOPLASTY N/A 12/13/2019    Procedure: ABDOMINOPLASTY, COSMETIC;  Surgeon: KHAI Muhammad MD;  Location: [facility] OR     MASTOPEXY Bilateral 12/13/2019    Procedure: BILATERAL MASTOPEXY;  Surgeon: KHAI Muhammad MD;  Location: [facility] OR       FAMILY HISTORY: History reviewed. No pertinent family history.    SOCIAL HISTORY:   Social History     Tobacco Use     Smoking status: Never Smoker     Smokeless tobacco: Never Used   Substance Use Topics     Alcohol use: Not on file          Allergies   Allergen Reactions     Amoxicillin GI Disturbance     Penicillin G Rash         Current Outpatient Medications:      tamsulosin (FLOMAX) 0.4 MG capsule, TAKE 1 CAPSULE BY MOUTH EVERY DAY, Disp: , Rfl:     Review Of Systems:  Skin: No rash, pruritis, or skin pigmentation  Eyes: No changes in vision  Ears/Nose/Throat: No changes in hearing, no nosebleeds  Respiratory: No shortness of breath, dyspnea on exertion, cough, or hemoptysis  Cardiovascular: No chest pain or palpitations  Gastrointestinal: No diarrhea or constipation. No abdominal pain. No hematochezia  Genitourinary: see HPI  Musculoskeletal: No pain or swelling of joints, normal range of motion  Neurologic: No weakness or tremors  Psychiatric: No recent changes in memory  "or mood  Hematologic/Lymphatic/Immunologic: No easy bruising or enlarged lymph nodes  Endocrine: No weight gain or loss      PHYSICAL EXAM:    /70   Pulse 76   Ht 1.626 m (5' 4\")   Wt 90.7 kg (200 lb)   SpO2 98%   BMI 34.33 kg/m    General appearance: In NAD, conversant  HEENT: Normocephalic and atraumatic, anicteric sclera  Cardiovascular: Not examined  Respiratory: normal, non-labored breathing  Back: no CVAT  Gastrointestinal: negative, Abdomen soft, non-tender, and non-distended.   Musculoskeletal: Not Examined  Peripheral Vascular/extremity: No peripheral edema  Skin: Normal temperature, turgor, and texture. No rash  Psychiatric: Appropriate affect, alert and oriented to person, place, and time    Cystoscopy: Not done      UA RESULTS:  No results for input(s): COLOR, APPEARANCE, URINEGLC, URINEBILI, URINEKETONE, SG, UBLD, URINEPH, PROTEIN, UROBILINOGEN, NITRITE, LEUKEST, RBCU, WBCU in the last 90138 hours.    Bladder Scan:     Other Labs:      Imaging Studies:     CT abd/pelvis w/o contrast    I reviewed the outside imaging personally        mild left hydroureteronephrosis down to the distal ureter. I do not see any obvious stone or obstructing mass      CLINICAL IMPRESSION:   41 yo F here with left flank pain, found to have left hydroureteronephrosis without obvious cause of obstruction in the distal ureter. On my review of the imaging I do not see a ureteral mass. The imaging available does not have a delayed film to follow the course of the ureter. Possible that she passed a stone and that the imaging from last week shortly after stone passage showed some residual hydro    PLAN:   Get a CT urogram and a BMP  Follow up afterwards      Neftali Nguyen MD   [facility]  [phone] clinic phone    "

## 2021-09-28 ENCOUNTER — HOSPITAL ENCOUNTER (OUTPATIENT)
Dept: CT IMAGING | Facility: CLINIC | Age: 41
Discharge: HOME | End: 2021-09-28
Attending: STUDENT IN AN ORGANIZED HEALTH CARE EDUCATION/TRAINING PROGRAM | Admitting: STUDENT IN AN ORGANIZED HEALTH CARE EDUCATION/TRAINING PROGRAM
Payer: COMMERCIAL

## 2021-09-28 DIAGNOSIS — N13.4 HYDROURETER: ICD-10-CM

## 2021-09-28 PROCEDURE — 74178 CT ABD&PLV WO CNTR FLWD CNTR: CPT

## 2021-09-28 PROCEDURE — 250N000009 HC RX 250: Performed by: STUDENT IN AN ORGANIZED HEALTH CARE EDUCATION/TRAINING PROGRAM

## 2021-09-28 PROCEDURE — 250N000011 HC RX IP 250 OP 636: Performed by: STUDENT IN AN ORGANIZED HEALTH CARE EDUCATION/TRAINING PROGRAM

## 2021-09-28 RX ORDER — IOPAMIDOL 755 MG/ML
98 INJECTION, SOLUTION INTRAVASCULAR ONCE
Status: COMPLETED | OUTPATIENT
Start: 2021-09-28 | End: 2021-09-28

## 2021-09-28 RX ADMIN — SODIUM CHLORIDE 68 ML: 9 INJECTION, SOLUTION INTRAVENOUS at 07:16

## 2021-09-28 RX ADMIN — IOPAMIDOL 98 ML: 755 INJECTION, SOLUTION INTRAVENOUS at 07:16

## 2021-09-29 ENCOUNTER — VIRTUAL VISIT (OUTPATIENT)
Dept: UROLOGY | Facility: CLINIC | Age: 41
End: 2021-09-29
Payer: COMMERCIAL

## 2021-09-29 VITALS — HEIGHT: 64 IN | WEIGHT: 200 LBS | BODY MASS INDEX: 34.15 KG/M2

## 2021-09-29 DIAGNOSIS — N13.4 HYDROURETER: Primary | ICD-10-CM

## 2021-09-29 PROCEDURE — 99213 OFFICE O/P EST LOW 20 MIN: CPT | Mod: GT | Performed by: STUDENT IN AN ORGANIZED HEALTH CARE EDUCATION/TRAINING PROGRAM

## 2021-09-29 ASSESSMENT — PAIN SCALES - GENERAL: PAINLEVEL: NO PAIN (0)

## 2021-09-29 ASSESSMENT — MIFFLIN-ST. JEOR: SCORE: 1557.19

## 2021-09-29 NOTE — LETTER
"9/29/2021       RE: Meghan Penaloza  [address]  [facility] [address]     Dear Colleague,    Thank you for referring your patient, Meghan Penaloza, to the [facility] [place] at [facility]. Please see a copy of my visit note below.        Meghan is a 40 year old who is being evaluated via a billable video visit.      How would you like to obtain your AVS? MyChart  If the video visit is dropped, the invitation should be resent by: Text to cell phone: [phone]  Will anyone else be joining your video visit? No  Video Start Time: 10:11 AM    CHIEF COMPLAINT   Meghan Penaloza who is a 40 year old female returns today for follow-up of left hydroureteronephrosis.      HPI   Meghan Penaloza is a 40 year old female who presents with a history of  left hydroureteronephrosis.     Symptoms have completely resolved    Repeat imaging with CT urogram shows resolution of hydronephrosis    PHYSICAL EXAM  Patient is a 40 year old  female   Vitals: Height 1.626 m (5' 4\"), weight 90.7 kg (200 lb).  Body mass index is 34.33 kg/m .  General Appearance Adult:   Alert, no acute distress, oriented  HENT: throat/mouth:normal, good dentition  Lungs: no respiratory distress, or pursed lip breathing  Heart: No obvious jugular venous distension present  Musculoskeltal: extremities normal, no peripheral edema  Skin: no suspicious lesions or rashes  Neuro: Alert, oriented, speech and mentation normal  Psych: affect and mood normal    Component      Latest Ref Rng & Units 9/27/2021   Sodium      133 - 144 mmol/L 133   Potassium      3.4 - 5.3 mmol/L 4.7   Chloride      94 - 109 mmol/L 109   Carbon Dioxide      20 - 32 mmol/L 17 (L)   Anion Gap      3 - 14 mmol/L 7   Urea Nitrogen      7 - 30 mg/dL 12   Creatinine      0.52 - 1.04 mg/dL 0.66   Calcium      8.5 - 10.1 mg/dL 8.3 (L)   Glucose      70 - 99 mg/dL 85   GFR Estimate      >60 mL/min/1.73m2 >90       All pertinent imaging " reviewed:    CT urogram 9/28/2021                                                                  IMPRESSION:   1.  No urinary system calculi, renal masses or urothelial lesions in  the upper tracts. Partly distended urinary bladder without masses.  Previously seen mild bilateral pelvocaliectasis and ureterectasis has  resolved.    No concerning filling defects, no hydronephrosis    UA 9/24/2021: completely unremarkable, no blood, neg nitrites, no leukest    ASSESSMENT and PLAN  40 year old female returns today for follow-up of left hydroureteronephrosis.      Normal renal function. Normal CT urogram with resolution of hydronephrosis. Urinalysis from 9/24/2021 unremarkable. Asymptomatic. Discussed that she may have passed a stone spontaneously and had some residual hydroureteronephrosis from this during her episode of flank pain. She should work on remaining hydrated in case this was indeed the reason for this episode. Does not need to continue flomax. No indication for operative intervention at this point.    Follow up as needed if symptoms recur      Neftali Nguyen MD   [facility]  [facility] Phone: [phone]      Video-Visit Details    Type of service:  Video Visit    Video End Time:10:17 AM    Originating Location (pt. Location): Home    Distant Location (provider location):  [facility] UROLOGY [facility]     Platform used for Video Visit: Doximity converted to phone visit due to audiovisual issues

## 2021-09-29 NOTE — PROGRESS NOTES
"    Meghan is a 40 year old who is being evaluated via a billable video visit.      How would you like to obtain your AVS? MyChart  If the video visit is dropped, the invitation should be resent by: Text to cell phone: [phone]  Will anyone else be joining your video visit? No  Video Start Time: 10:11 AM    CHIEF COMPLAINT   Meghan Penaloza who is a 40 year old female returns today for follow-up of left hydroureteronephrosis.      HPI   Meghan Penaloza is a 40 year old female who presents with a history of  left hydroureteronephrosis.     Symptoms have completely resolved    Repeat imaging with CT urogram shows resolution of hydronephrosis    PHYSICAL EXAM  Patient is a 40 year old  female   Vitals: Height 1.626 m (5' 4\"), weight 90.7 kg (200 lb).  Body mass index is 34.33 kg/m .  General Appearance Adult:   Alert, no acute distress, oriented  HENT: throat/mouth:normal, good dentition  Lungs: no respiratory distress, or pursed lip breathing  Heart: No obvious jugular venous distension present  Musculoskeltal: extremities normal, no peripheral edema  Skin: no suspicious lesions or rashes  Neuro: Alert, oriented, speech and mentation normal  Psych: affect and mood normal    Component      Latest Ref Rng & Units 9/27/2021   Sodium      133 - 144 mmol/L 133   Potassium      3.4 - 5.3 mmol/L 4.7   Chloride      94 - 109 mmol/L 109   Carbon Dioxide      20 - 32 mmol/L 17 (L)   Anion Gap      3 - 14 mmol/L 7   Urea Nitrogen      7 - 30 mg/dL 12   Creatinine      0.52 - 1.04 mg/dL 0.66   Calcium      8.5 - 10.1 mg/dL 8.3 (L)   Glucose      70 - 99 mg/dL 85   GFR Estimate      >60 mL/min/1.73m2 >90       All pertinent imaging reviewed:    CT urogram 9/28/2021                                                                  IMPRESSION:   1.  No urinary system calculi, renal masses or urothelial lesions in  the upper tracts. Partly distended urinary bladder without masses.  Previously seen mild bilateral pelvocaliectasis and " ureterectasis has  resolved.    No concerning filling defects, no hydronephrosis    UA 9/24/2021: completely unremarkable, no blood, neg nitrites, no leukest    ASSESSMENT and PLAN  40 year old female returns today for follow-up of left hydroureteronephrosis.      Normal renal function. Normal CT urogram with resolution of hydronephrosis. Urinalysis from 9/24/2021 unremarkable. Asymptomatic. Discussed that she may have passed a stone spontaneously and had some residual hydroureteronephrosis from this during her episode of flank pain. She should work on remaining hydrated in case this was indeed the reason for this episode. Does not need to continue flomax. No indication for operative intervention at this point.    Follow up as needed if symptoms recur      Neftali Nguyen MD   [facility]  [facility] Phone: [phone]      Video-Visit Details    Type of service:  Video Visit    Video End Time:10:17 AM    Originating Location (pt. Location): Home    Distant Location (provider location):  [facility]     Platform used for Video Visit: Doximity converted to phone visit due to audiovisual issues

## 2022-04-30 ENCOUNTER — HEALTH MAINTENANCE LETTER (OUTPATIENT)
Age: 42
End: 2022-04-30

## 2022-11-20 ENCOUNTER — HEALTH MAINTENANCE LETTER (OUTPATIENT)
Age: 42
End: 2022-11-20

## 2023-06-02 ENCOUNTER — HEALTH MAINTENANCE LETTER (OUTPATIENT)
Age: 43
End: 2023-06-02

## 2024-02-03 ENCOUNTER — HEALTH MAINTENANCE LETTER (OUTPATIENT)
Age: 44
End: 2024-02-03

## 2024-06-22 ENCOUNTER — HEALTH MAINTENANCE LETTER (OUTPATIENT)
Age: 44
End: 2024-06-22

## (undated) DEVICE — SU MONOCRYL 3-0 SH 27" UND Y416H

## (undated) DEVICE — SU STRATAFIX 2-0 MH 36" SXPD2B412

## (undated) DEVICE — STPL SKIN 35W 054887

## (undated) DEVICE — ESU GROUND PAD ADULT W/CORD E7507

## (undated) DEVICE — SU ETHIBOND 0 CT-1 CR 8X18" CX21D

## (undated) DEVICE — SYR BULB IRRIG DOVER 60 ML LATEX FREE 67000

## (undated) DEVICE — ESU ELEC BLADE HEX-LOCKING 2.5" E1450X

## (undated) DEVICE — SPONGE LAP 18X18" 1515

## (undated) DEVICE — ESU PENCIL W/SMOKE EVAC E2515HS

## (undated) DEVICE — SU CHROMIC 4-0 FS-2 27" 635H

## (undated) DEVICE — TUBING SMOKE EVAC 7/8"X6 UNSTERILE LVT-102

## (undated) DEVICE — DRSG STERI STRIP 1/2X4" R1547

## (undated) DEVICE — PREP CHLORAPREP 26ML TINTED ORANGE  260815

## (undated) DEVICE — GLOVE PROTEXIS W/NEU-THERA 7.0  2D73TE70

## (undated) DEVICE — DRAPE IOBAN INCISE 23X17" 6650EZ

## (undated) DEVICE — TUBING SUCTION 10'X3/16" N510

## (undated) DEVICE — DRSG GAUZE 4X4" 3033

## (undated) DEVICE — SU PDS II 1 CT-1 MONOFIL Z347H

## (undated) DEVICE — SU STRATAFIX MONOCRYL 3-0 SPIRAL SH 23CM SXMP1B427

## (undated) DEVICE — BNDG ABDOMINAL BINDER 09X46-62"

## (undated) DEVICE — DRAPE SPLIT EENT 76X124" 3X28" 9447

## (undated) DEVICE — SUCTION TIP YANKAUER W/O VENT K86

## (undated) DEVICE — DECANTER TRANSFER DEVICE 2008S

## (undated) DEVICE — BLADE KNIFE SURG 10 371110

## (undated) DEVICE — DRSG KERLIX 4 1/2"X4YDS ROLL 6715

## (undated) DEVICE — DRSG ABDOMINAL 07 1/2X8" 7197D

## (undated) DEVICE — SU STRATAFIX MONOCRYL 3-0 SPIRAL PS-2 45CM SXMP1B107

## (undated) DEVICE — SOL WATER IRRIG 1000ML BOTTLE 07139-09

## (undated) DEVICE — SOL NACL 0.9% INJ 1000ML BAG 07983-09

## (undated) DEVICE — GLOVE PROTEXIS BLUE W/NEU-THERA 7.0  2D73EB70

## (undated) DEVICE — DRAPE SHEET HALF 40X60" 9358

## (undated) DEVICE — DRSG TEGADERM 2 3/8X2 3/4" 1624W

## (undated) DEVICE — SU MONOCRYL 4-0 PS-2 18" UND Y496G

## (undated) DEVICE — NDL 19GA 1.5"

## (undated) DEVICE — MARKER SKIN DOUBLE TIP W/FLEXI-RULER W/LABELS

## (undated) DEVICE — SU MONOCRYL 2-0 SH 27" UND Y417H

## (undated) DEVICE — PACK MINOR SBA15MIFSE

## (undated) DEVICE — SYR 10ML LL W/O NDL

## (undated) RX ORDER — PROPOFOL 10 MG/ML
INJECTION, EMULSION INTRAVENOUS
Status: DISPENSED
Start: 2019-12-13

## (undated) RX ORDER — BUPIVACAINE HYDROCHLORIDE 2.5 MG/ML
INJECTION, SOLUTION EPIDURAL; INFILTRATION; INTRACAUDAL
Status: DISPENSED
Start: 2019-12-31

## (undated) RX ORDER — LIDOCAINE HYDROCHLORIDE 20 MG/ML
INJECTION, SOLUTION INFILTRATION; PERINEURAL
Status: DISPENSED
Start: 2019-12-13

## (undated) RX ORDER — TRIAMCINOLONE ACETONIDE 40 MG/ML
INJECTION, SUSPENSION INTRA-ARTICULAR; INTRAMUSCULAR
Status: DISPENSED
Start: 2019-12-31

## (undated) RX ORDER — HEPARIN SODIUM 5000 [USP'U]/ML
INJECTION, SOLUTION INTRAVENOUS; SUBCUTANEOUS
Status: DISPENSED
Start: 2019-12-13

## (undated) RX ORDER — ONDANSETRON 2 MG/ML
INJECTION INTRAMUSCULAR; INTRAVENOUS
Status: DISPENSED
Start: 2019-12-13

## (undated) RX ORDER — DEXAMETHASONE SODIUM PHOSPHATE 4 MG/ML
INJECTION, SOLUTION INTRA-ARTICULAR; INTRALESIONAL; INTRAMUSCULAR; INTRAVENOUS; SOFT TISSUE
Status: DISPENSED
Start: 2019-12-13

## (undated) RX ORDER — CEFAZOLIN SODIUM 2 G/100ML
INJECTION, SOLUTION INTRAVENOUS
Status: DISPENSED
Start: 2019-12-13

## (undated) RX ORDER — OXYCODONE HYDROCHLORIDE 5 MG/1
TABLET ORAL
Status: DISPENSED
Start: 2019-12-13

## (undated) RX ORDER — PHENYLEPHRINE HCL IN 0.9% NACL 1 MG/10 ML
SYRINGE (ML) INTRAVENOUS
Status: DISPENSED
Start: 2019-12-13

## (undated) RX ORDER — CEFAZOLIN SODIUM 1 G/3ML
INJECTION, POWDER, FOR SOLUTION INTRAMUSCULAR; INTRAVENOUS
Status: DISPENSED
Start: 2019-12-13

## (undated) RX ORDER — FENTANYL CITRATE 50 UG/ML
INJECTION, SOLUTION INTRAMUSCULAR; INTRAVENOUS
Status: DISPENSED
Start: 2019-12-13

## (undated) RX ORDER — GINSENG 100 MG
CAPSULE ORAL
Status: DISPENSED
Start: 2019-12-13

## (undated) RX ORDER — BUPIVACAINE HYDROCHLORIDE 2.5 MG/ML
INJECTION, SOLUTION INFILTRATION; PERINEURAL
Status: DISPENSED
Start: 2019-12-13

## (undated) RX ORDER — ACETAMINOPHEN 325 MG/1
TABLET ORAL
Status: DISPENSED
Start: 2019-12-13

## (undated) RX ORDER — GENTAMICIN 40 MG/ML
INJECTION, SOLUTION INTRAMUSCULAR; INTRAVENOUS
Status: DISPENSED
Start: 2019-12-31

## (undated) RX ORDER — LIDOCAINE HYDROCHLORIDE AND EPINEPHRINE 10; 10 MG/ML; UG/ML
INJECTION, SOLUTION INFILTRATION; PERINEURAL
Status: DISPENSED
Start: 2019-12-13

## (undated) RX ORDER — LIDOCAINE HYDROCHLORIDE 10 MG/ML
INJECTION, SOLUTION EPIDURAL; INFILTRATION; INTRACAUDAL; PERINEURAL
Status: DISPENSED
Start: 2019-12-13

## (undated) RX ORDER — POLYMYXIN B SULFATE 500000 [IU]/1
INJECTION, POWDER, LYOPHILIZED, FOR SOLUTION INTRAMUSCULAR; INTRATHECAL; INTRAVENOUS; OPHTHALMIC
Status: DISPENSED
Start: 2019-12-31

## (undated) RX ORDER — GABAPENTIN 300 MG/1
CAPSULE ORAL
Status: DISPENSED
Start: 2019-12-13

## (undated) RX ORDER — HYDROMORPHONE HYDROCHLORIDE 2 MG/ML
INJECTION, SOLUTION INTRAMUSCULAR; INTRAVENOUS; SUBCUTANEOUS
Status: DISPENSED
Start: 2019-12-13